# Patient Record
Sex: MALE | Race: BLACK OR AFRICAN AMERICAN | NOT HISPANIC OR LATINO | Employment: OTHER | URBAN - METROPOLITAN AREA
[De-identification: names, ages, dates, MRNs, and addresses within clinical notes are randomized per-mention and may not be internally consistent; named-entity substitution may affect disease eponyms.]

---

## 2017-11-28 LAB
BILIRUB SERPL-MCNC: NEGATIVE MG/DL
BLOOD URINE, POC: NEGATIVE
CLARITY, POC UA: NORMAL
COLOR, POC UA: NORMAL
GLUCOSE UR QL STRIP: NEGATIVE
KETONES UR QL STRIP: NEGATIVE
LEUKOCYTE EST, POC UA: NEGATIVE
NITRITE, POC UA: NEGATIVE
PH, POC UA: 6.5
PROTEIN, POC: NEGATIVE
SPECIFIC GRAVITY, POC UA: 1
UROBILINOGEN, POC UA: NORMAL

## 2019-11-08 LAB
INFLUENZA A ANTIGEN, POC: NEGATIVE
INFLUENZA B ANTIGEN, POC: NEGATIVE

## 2022-04-07 ENCOUNTER — HISTORICAL (OUTPATIENT)
Dept: ADMINISTRATIVE | Facility: HOSPITAL | Age: 37
End: 2022-04-07

## 2022-04-24 VITALS
BODY MASS INDEX: 31.64 KG/M2 | OXYGEN SATURATION: 96 % | WEIGHT: 208.75 LBS | SYSTOLIC BLOOD PRESSURE: 134 MMHG | DIASTOLIC BLOOD PRESSURE: 73 MMHG | HEIGHT: 68 IN

## 2022-09-15 ENCOUNTER — HISTORICAL (OUTPATIENT)
Dept: ADMINISTRATIVE | Facility: HOSPITAL | Age: 37
End: 2022-09-15

## 2022-09-22 ENCOUNTER — HISTORICAL (OUTPATIENT)
Dept: ADMINISTRATIVE | Facility: HOSPITAL | Age: 37
End: 2022-09-22

## 2023-03-30 ENCOUNTER — HOSPITAL ENCOUNTER (EMERGENCY)
Facility: HOSPITAL | Age: 38
Discharge: HOME | End: 2023-03-30
Attending: EMERGENCY MEDICINE
Payer: COMMERCIAL

## 2023-03-30 VITALS
RESPIRATION RATE: 18 BRPM | SYSTOLIC BLOOD PRESSURE: 141 MMHG | DIASTOLIC BLOOD PRESSURE: 83 MMHG | TEMPERATURE: 99 F | OXYGEN SATURATION: 94 % | HEART RATE: 77 BPM

## 2023-03-30 DIAGNOSIS — R51.9 NONINTRACTABLE HEADACHE, UNSPECIFIED CHRONICITY PATTERN, UNSPECIFIED HEADACHE TYPE: Primary | ICD-10-CM

## 2023-03-30 LAB
FLUAV AG UPPER RESP QL IA.RAPID: NOT DETECTED
FLUBV AG UPPER RESP QL IA.RAPID: NOT DETECTED
SARS-COV-2 RNA RESP QL NAA+PROBE: NOT DETECTED

## 2023-03-30 PROCEDURE — 99285 EMERGENCY DEPT VISIT HI MDM: CPT | Mod: 25

## 2023-03-30 PROCEDURE — 63600175 PHARM REV CODE 636 W HCPCS: Performed by: PHYSICIAN ASSISTANT

## 2023-03-30 PROCEDURE — 96374 THER/PROPH/DIAG INJ IV PUSH: CPT

## 2023-03-30 PROCEDURE — 96375 TX/PRO/DX INJ NEW DRUG ADDON: CPT

## 2023-03-30 PROCEDURE — 0240U COVID/FLU A&B PCR: CPT | Performed by: PHYSICIAN ASSISTANT

## 2023-03-30 PROCEDURE — 63600175 PHARM REV CODE 636 W HCPCS: Performed by: NURSE PRACTITIONER

## 2023-03-30 PROCEDURE — 25000003 PHARM REV CODE 250: Performed by: NURSE PRACTITIONER

## 2023-03-30 PROCEDURE — 96372 THER/PROPH/DIAG INJ SC/IM: CPT | Performed by: PHYSICIAN ASSISTANT

## 2023-03-30 PROCEDURE — 96361 HYDRATE IV INFUSION ADD-ON: CPT

## 2023-03-30 RX ORDER — KETOROLAC TROMETHAMINE 30 MG/ML
60 INJECTION, SOLUTION INTRAMUSCULAR; INTRAVENOUS
Status: COMPLETED | OUTPATIENT
Start: 2023-03-30 | End: 2023-03-30

## 2023-03-30 RX ORDER — PROCHLORPERAZINE EDISYLATE 5 MG/ML
10 INJECTION INTRAMUSCULAR; INTRAVENOUS
Status: COMPLETED | OUTPATIENT
Start: 2023-03-30 | End: 2023-03-30

## 2023-03-30 RX ORDER — DIPHENHYDRAMINE HYDROCHLORIDE 50 MG/ML
25 INJECTION INTRAMUSCULAR; INTRAVENOUS
Status: COMPLETED | OUTPATIENT
Start: 2023-03-30 | End: 2023-03-30

## 2023-03-30 RX ORDER — PROCHLORPERAZINE MALEATE 10 MG
10 TABLET ORAL EVERY 6 HOURS PRN
Qty: 15 TABLET | Refills: 0 | Status: SHIPPED | OUTPATIENT
Start: 2023-03-30 | End: 2023-11-07

## 2023-03-30 RX ADMIN — DIPHENHYDRAMINE HYDROCHLORIDE 25 MG: 50 INJECTION INTRAMUSCULAR; INTRAVENOUS at 08:03

## 2023-03-30 RX ADMIN — SODIUM CHLORIDE 1000 ML: 9 INJECTION, SOLUTION INTRAVENOUS at 08:03

## 2023-03-30 RX ADMIN — PROCHLORPERAZINE EDISYLATE 10 MG: 5 INJECTION INTRAMUSCULAR; INTRAVENOUS at 08:03

## 2023-03-30 RX ADMIN — KETOROLAC TROMETHAMINE 60 MG: 30 INJECTION, SOLUTION INTRAMUSCULAR at 08:03

## 2023-03-30 NOTE — FIRST PROVIDER EVALUATION
Medical screening examination initiated.  I have conducted a focused provider triage encounter, findings are as follows:    Brief history of present illness:  38 yo male presents to ED for evaluation of headache and nausea for the past 2 days. Complains of pressure behind eyes. Complains cough. Denies any nausea, vomiting, photophobia, dizziness. Took Excedrin 1 hour PTA.     Vitals:    03/30/23 1849   BP: (!) 141/83   Pulse: 77   Resp: 18   Temp: 99 °F (37.2 °C)   TempSrc: Oral   SpO2: (!) 94%       Pertinent physical exam:  Patient is awake and alert and oriented.  Ambulatory to triage.  In no acute distress.      Brief workup plan:  COVID/flu, toradol    Preliminary workup initiated; this workup will be continued and followed by the physician or advanced practice provider that is assigned to the patient when roomed.

## 2023-03-31 NOTE — ED PROVIDER NOTES
Encounter Date: 3/30/2023       History     Chief Complaint   Patient presents with    Headache     POV with migraine and nausea x2 days, denies photosensitivity. Usually takes excedrin, no relief     See MDM    The history is provided by the patient. No  was used.   Review of patient's allergies indicates:  No Known Allergies  Past Medical History:   Diagnosis Date    Known health problems: none      Past Surgical History:   Procedure Laterality Date    STOMACH SURGERY      ulcer     History reviewed. No pertinent family history.  Social History     Tobacco Use    Smoking status: Never    Smokeless tobacco: Never   Substance Use Topics    Alcohol use: Not Currently    Drug use: Not Currently     Review of Systems   Constitutional:  Negative for fever.   Respiratory:  Negative for cough and shortness of breath.    Cardiovascular:  Negative for chest pain.   Gastrointestinal:  Positive for nausea. Negative for abdominal pain.   Genitourinary:  Negative for difficulty urinating and dysuria.   Musculoskeletal:  Negative for gait problem.   Skin:  Negative for color change.   Neurological:  Positive for headaches. Negative for dizziness and speech difficulty.   Psychiatric/Behavioral:  Negative for hallucinations and suicidal ideas.    All other systems reviewed and are negative.    Physical Exam     Initial Vitals [03/30/23 1849]   BP Pulse Resp Temp SpO2   (!) 141/83 77 18 99 °F (37.2 °C) (!) 94 %      MAP       --         Physical Exam    Nursing note and vitals reviewed.  Constitutional: He appears well-developed and well-nourished.   HENT:   Head: Normocephalic.   Eyes: EOM are normal.   Neck: Neck supple.   Normal range of motion.  Cardiovascular:  Normal rate, regular rhythm, normal heart sounds and intact distal pulses.           Pulmonary/Chest: Breath sounds normal.   Abdominal: Abdomen is soft. Bowel sounds are normal.   Musculoskeletal:         General: Normal range of motion.       Cervical back: Normal range of motion and neck supple.     Neurological: He is alert and oriented to person, place, and time. He has normal strength.   Skin: Skin is warm and dry. Capillary refill takes less than 2 seconds.   Psychiatric: He has a normal mood and affect. His behavior is normal. Judgment and thought content normal.       ED Course   Procedures  Labs Reviewed   COVID/FLU A&B PCR - Normal    Narrative:     The Xpert Xpress SARS-CoV-2/FLU/RSV plus is a rapid, multiplexed real-time PCR test intended for the simultaneous qualitative detection and differentiation of SARS-CoV-2, Influenza A, Influenza B, and respiratory syncytial virus (RSV) viral RNA in either nasopharyngeal swab or nasal swab specimens.                Imaging Results              CT Head Without Contrast (Preliminary result)  Result time 03/30/23 20:34:28      Preliminary result by Cachorro Heard Jr., MD (03/30/23 20:34:28)                   Narrative:    START OF REPORT:  TECHNIQUE: CT OF THE HEAD WAS PERFORMED WITHOUT INTRAVENOUS CONTRAST WITH AXIAL AS WELL AS CORONAL AND SAGITTAL IMAGES.    COMPARISON: NONE.    DOSAGE INFORMATION: AUTOMATED EXPOSURE CONTROL WAS UTILIZED.    CLINICAL HISTORY: HEADACHE.    Findings:  Hemorrhage: No acute intracranial hemorrhage is seen.  CSF spaces: The ventricles sulci and basal cisterns are within normal limits.  Brain parenchyma: Unremarkable with preservation of the grey white junction throughout.  Cerebellum: Unremarkable.  Sella and skull base: The sella appears to be within normal limits for age.  Intracranial calcifications: Incidental note is made of bilateral choroid plexus calcification. Incidental note is made of some pineal region calcification.  Calvarium: No acute linear or depressed skull fracture is seen.    Maxillofacial Structures:  Paranasal sinuses: There are some lobulated soft tissue densities in both maxillary sinuses. These may reflect polyps versus mucous retention  cysts.  Orbits: The orbits appear unremarkable.  Zygomatic arches: The zygomatic arches are intact and unremarkable.  Temporal bones and mastoids: The temporal bones and mastoids appear unremarkable.  TMJ: The mandibular condyles appear normally placed with respect to the mandibular fossa.      Impression:  1. No acute intracranial process identified. Details and findings as noted above.                          Preliminary result by Interface, Rad Results In (03/30/23 20:34:28)                   Narrative:    START OF REPORT:  TECHNIQUE: CT OF THE HEAD WAS PERFORMED WITHOUT INTRAVENOUS CONTRAST WITH AXIAL AS WELL AS CORONAL AND SAGITTAL IMAGES.    COMPARISON: NONE.    DOSAGE INFORMATION: AUTOMATED EXPOSURE CONTROL WAS UTILIZED.    CLINICAL HISTORY: HEADACHE.    Findings:  Hemorrhage: No acute intracranial hemorrhage is seen.  CSF spaces: The ventricles sulci and basal cisterns are within normal limits.  Brain parenchyma: Unremarkable with preservation of the grey white junction throughout.  Cerebellum: Unremarkable.  Sella and skull base: The sella appears to be within normal limits for age.  Intracranial calcifications: Incidental note is made of bilateral choroid plexus calcification. Incidental note is made of some pineal region calcification.  Calvarium: No acute linear or depressed skull fracture is seen.    Maxillofacial Structures:  Paranasal sinuses: There are some lobulated soft tissue densities in both maxillary sinuses. These may reflect polyps versus mucous retention cysts.  Orbits: The orbits appear unremarkable.  Zygomatic arches: The zygomatic arches are intact and unremarkable.  Temporal bones and mastoids: The temporal bones and mastoids appear unremarkable.  TMJ: The mandibular condyles appear normally placed with respect to the mandibular fossa.      Impression:  1. No acute intracranial process identified. Details and findings as noted above.                                         Medications    sodium chloride 0.9% bolus 1,000 mL 1,000 mL (1,000 mLs Intravenous New Bag 3/30/23 2054)   ketorolac injection 60 mg (60 mg Intramuscular Given 3/30/23 2053)   prochlorperazine injection Soln 10 mg (10 mg Intravenous Given 3/30/23 2053)   diphenhydrAMINE injection 25 mg (25 mg Intravenous Given 3/30/23 2052)     Medical Decision Making:   Initial Assessment:   Historian:  Patient.  Patient is a 37-year-old male  that presents with headache that has been present 2 days. Associated symptoms nausea. Surrounding information is patient has had headaches before but it is worsening. Exacerbated by nothing. Relieved by nothing. Patient treatment prior to arrival Excedrin. Risk factors include none. Other history pertaining to this complaint nothing.   Assessment:  See physical exam.    Differential Diagnosis:   Intracranial hemorrhage, headache, migraine  ED Management:  History was obtained.  Physical was performed.  CT head was negative.  Patient has no neurological deficits.  We will send him home on Compazine for headaches.  No medical or surgical consult for indicated.  No social determinants that affect his healthcare were noted.  Additional MDM:     X-Rays: CT of head was negative                      Clinical Impression:   Final diagnoses:  [R51.9] Nonintractable headache, unspecified chronicity pattern, unspecified headache type (Primary)        ED Disposition Condition    Discharge Stable          ED Prescriptions       Medication Sig Dispense Start Date End Date Auth. Provider    prochlorperazine (COMPAZINE) 10 MG tablet Take 1 tablet (10 mg total) by mouth every 6 (six) hours as needed. 15 tablet 3/30/2023 -- NEEL Schmitt          Follow-up Information       Follow up With Specialties Details Why Contact Info    Your Primary Care Provider  Call in 3 days ed follow up              NEEL Schmitt  03/30/23 2106

## 2023-07-11 DIAGNOSIS — G43.709 CHRONIC MIGRAINE WITHOUT AURA WITHOUT STATUS MIGRAINOSUS, NOT INTRACTABLE: Primary | ICD-10-CM

## 2023-09-24 ENCOUNTER — HOSPITAL ENCOUNTER (EMERGENCY)
Facility: HOSPITAL | Age: 38
Discharge: HOME | End: 2023-09-24
Attending: STUDENT IN AN ORGANIZED HEALTH CARE EDUCATION/TRAINING PROGRAM
Payer: COMMERCIAL

## 2023-09-24 VITALS
DIASTOLIC BLOOD PRESSURE: 84 MMHG | WEIGHT: 220 LBS | RESPIRATION RATE: 20 BRPM | HEIGHT: 65 IN | TEMPERATURE: 99 F | OXYGEN SATURATION: 97 % | HEART RATE: 90 BPM | SYSTOLIC BLOOD PRESSURE: 136 MMHG | BODY MASS INDEX: 36.65 KG/M2

## 2023-09-24 DIAGNOSIS — K52.9 GASTROENTERITIS: ICD-10-CM

## 2023-09-24 DIAGNOSIS — R50.9 FEVER: Primary | ICD-10-CM

## 2023-09-24 DIAGNOSIS — K29.70 GASTRITIS, PRESENCE OF BLEEDING UNSPECIFIED, UNSPECIFIED CHRONICITY, UNSPECIFIED GASTRITIS TYPE: ICD-10-CM

## 2023-09-24 DIAGNOSIS — R51.9 NONINTRACTABLE HEADACHE, UNSPECIFIED CHRONICITY PATTERN, UNSPECIFIED HEADACHE TYPE: ICD-10-CM

## 2023-09-24 DIAGNOSIS — R11.2 NAUSEA AND VOMITING, UNSPECIFIED VOMITING TYPE: ICD-10-CM

## 2023-09-24 DIAGNOSIS — K76.89 HEPATIC CYST: ICD-10-CM

## 2023-09-24 LAB
ABS NEUT (OLG): 22.25 X10(3)/MCL (ref 2.1–9.2)
ALBUMIN SERPL-MCNC: 4.1 G/DL (ref 3.5–5)
ALBUMIN/GLOB SERPL: 1.2 RATIO (ref 1.1–2)
ALP SERPL-CCNC: 62 UNIT/L (ref 40–150)
ALT SERPL-CCNC: 24 UNIT/L (ref 0–55)
APPEARANCE UR: CLEAR
AST SERPL-CCNC: 19 UNIT/L (ref 5–34)
BACTERIA #/AREA URNS AUTO: NORMAL /HPF
BILIRUB SERPL-MCNC: 0.4 MG/DL
BILIRUB UR QL STRIP.AUTO: NEGATIVE
BUN SERPL-MCNC: 11.6 MG/DL (ref 8.9–20.6)
CALCIUM SERPL-MCNC: 9.4 MG/DL (ref 8.4–10.2)
CHLORIDE SERPL-SCNC: 107 MMOL/L (ref 98–107)
CO2 SERPL-SCNC: 23 MMOL/L (ref 22–29)
COLOR UR AUTO: YELLOW
CREAT SERPL-MCNC: 1.11 MG/DL (ref 0.73–1.18)
ERYTHROCYTE [DISTWIDTH] IN BLOOD BY AUTOMATED COUNT: 13.2 % (ref 11.5–17)
FLUAV AG UPPER RESP QL IA.RAPID: NOT DETECTED
FLUBV AG UPPER RESP QL IA.RAPID: NOT DETECTED
GFR SERPLBLD CREATININE-BSD FMLA CKD-EPI: >60 MLS/MIN/1.73/M2
GLOBULIN SER-MCNC: 3.4 GM/DL (ref 2.4–3.5)
GLUCOSE SERPL-MCNC: 130 MG/DL (ref 74–100)
GLUCOSE UR QL STRIP.AUTO: NEGATIVE
HCT VFR BLD AUTO: 42.2 % (ref 42–52)
HGB BLD-MCNC: 14.1 G/DL (ref 14–18)
INSTRUMENT WBC (OLG): 23.92 X10(3)/MCL
KETONES UR QL STRIP.AUTO: NEGATIVE
LACTATE SERPL-SCNC: 1 MMOL/L (ref 0.5–2.2)
LEUKOCYTE ESTERASE UR QL STRIP.AUTO: NEGATIVE
LYMPHOCYTES NFR BLD MANUAL: 0.72 X10(3)/MCL
LYMPHOCYTES NFR BLD MANUAL: 3 %
MCH RBC QN AUTO: 29.1 PG (ref 27–31)
MCHC RBC AUTO-ENTMCNC: 33.4 G/DL (ref 33–36)
MCV RBC AUTO: 87 FL (ref 80–94)
MONOCYTES NFR BLD MANUAL: 0.96 X10(3)/MCL (ref 0.1–1.3)
MONOCYTES NFR BLD MANUAL: 4 %
NEUTROPHILS NFR BLD MANUAL: 93 %
NITRITE UR QL STRIP.AUTO: NEGATIVE
NRBC BLD AUTO-RTO: 0 %
PH UR STRIP.AUTO: 5.5 [PH]
PLATELET # BLD AUTO: 236 X10(3)/MCL (ref 130–400)
PLATELET # BLD EST: NORMAL 10*3/UL
PMV BLD AUTO: 10.2 FL (ref 7.4–10.4)
POTASSIUM SERPL-SCNC: 3.8 MMOL/L (ref 3.5–5.1)
PROT SERPL-MCNC: 7.5 GM/DL (ref 6.4–8.3)
PROT UR QL STRIP.AUTO: NEGATIVE
RBC # BLD AUTO: 4.85 X10(6)/MCL (ref 4.7–6.1)
RBC #/AREA URNS AUTO: <5 /HPF
RBC MORPH BLD: NORMAL
RBC UR QL AUTO: ABNORMAL
SARS-COV-2 RNA RESP QL NAA+PROBE: NOT DETECTED
SODIUM SERPL-SCNC: 139 MMOL/L (ref 136–145)
SP GR UR STRIP.AUTO: 1.02 (ref 1–1.03)
SQUAMOUS #/AREA URNS AUTO: <5 /HPF
UROBILINOGEN UR STRIP-ACNC: 0.2
WBC # SPEC AUTO: 23.92 X10(3)/MCL (ref 4.5–11.5)
WBC #/AREA URNS AUTO: <5 /HPF

## 2023-09-24 PROCEDURE — 96361 HYDRATE IV INFUSION ADD-ON: CPT

## 2023-09-24 PROCEDURE — 85027 COMPLETE CBC AUTOMATED: CPT | Performed by: PHYSICIAN ASSISTANT

## 2023-09-24 PROCEDURE — 63600175 PHARM REV CODE 636 W HCPCS: Performed by: PHYSICIAN ASSISTANT

## 2023-09-24 PROCEDURE — 99285 EMERGENCY DEPT VISIT HI MDM: CPT | Mod: 25

## 2023-09-24 PROCEDURE — 83605 ASSAY OF LACTIC ACID: CPT | Performed by: STUDENT IN AN ORGANIZED HEALTH CARE EDUCATION/TRAINING PROGRAM

## 2023-09-24 PROCEDURE — 81001 URINALYSIS AUTO W/SCOPE: CPT | Performed by: PHYSICIAN ASSISTANT

## 2023-09-24 PROCEDURE — 87040 BLOOD CULTURE FOR BACTERIA: CPT | Performed by: STUDENT IN AN ORGANIZED HEALTH CARE EDUCATION/TRAINING PROGRAM

## 2023-09-24 PROCEDURE — 63600175 PHARM REV CODE 636 W HCPCS: Performed by: STUDENT IN AN ORGANIZED HEALTH CARE EDUCATION/TRAINING PROGRAM

## 2023-09-24 PROCEDURE — 0240U COVID/FLU A&B PCR: CPT | Performed by: PHYSICIAN ASSISTANT

## 2023-09-24 PROCEDURE — 96375 TX/PRO/DX INJ NEW DRUG ADDON: CPT

## 2023-09-24 PROCEDURE — 25000003 PHARM REV CODE 250: Performed by: PHYSICIAN ASSISTANT

## 2023-09-24 PROCEDURE — 80053 COMPREHEN METABOLIC PANEL: CPT | Performed by: PHYSICIAN ASSISTANT

## 2023-09-24 PROCEDURE — 25500020 PHARM REV CODE 255: Performed by: STUDENT IN AN ORGANIZED HEALTH CARE EDUCATION/TRAINING PROGRAM

## 2023-09-24 PROCEDURE — 96374 THER/PROPH/DIAG INJ IV PUSH: CPT

## 2023-09-24 RX ORDER — METOCLOPRAMIDE HYDROCHLORIDE 5 MG/ML
10 INJECTION INTRAMUSCULAR; INTRAVENOUS
Status: COMPLETED | OUTPATIENT
Start: 2023-09-24 | End: 2023-09-24

## 2023-09-24 RX ORDER — ONDANSETRON 4 MG/1
4 TABLET, ORALLY DISINTEGRATING ORAL EVERY 8 HOURS PRN
Qty: 15 TABLET | Refills: 0 | Status: SHIPPED | OUTPATIENT
Start: 2023-09-24 | End: 2023-09-29

## 2023-09-24 RX ORDER — KETOROLAC TROMETHAMINE 30 MG/ML
15 INJECTION, SOLUTION INTRAMUSCULAR; INTRAVENOUS
Status: COMPLETED | OUTPATIENT
Start: 2023-09-24 | End: 2023-09-24

## 2023-09-24 RX ORDER — BUTALBITAL, ACETAMINOPHEN AND CAFFEINE 50; 325; 40 MG/1; MG/1; MG/1
1 TABLET ORAL EVERY 12 HOURS PRN
Qty: 20 TABLET | Refills: 0 | Status: SHIPPED | OUTPATIENT
Start: 2023-09-24 | End: 2023-10-04

## 2023-09-24 RX ORDER — ACETAMINOPHEN 500 MG
1000 TABLET ORAL
Status: COMPLETED | OUTPATIENT
Start: 2023-09-24 | End: 2023-09-24

## 2023-09-24 RX ORDER — DIPHENHYDRAMINE HYDROCHLORIDE 50 MG/ML
25 INJECTION INTRAMUSCULAR; INTRAVENOUS
Status: COMPLETED | OUTPATIENT
Start: 2023-09-24 | End: 2023-09-24

## 2023-09-24 RX ORDER — LEVOFLOXACIN 750 MG/1
750 TABLET ORAL DAILY
Qty: 5 TABLET | Refills: 0 | Status: SHIPPED | OUTPATIENT
Start: 2023-09-24 | End: 2023-09-29

## 2023-09-24 RX ORDER — ONDANSETRON 2 MG/ML
4 INJECTION INTRAMUSCULAR; INTRAVENOUS
Status: COMPLETED | OUTPATIENT
Start: 2023-09-24 | End: 2023-09-24

## 2023-09-24 RX ADMIN — ONDANSETRON 4 MG: 2 INJECTION INTRAMUSCULAR; INTRAVENOUS at 07:09

## 2023-09-24 RX ADMIN — SODIUM CHLORIDE, POTASSIUM CHLORIDE, SODIUM LACTATE AND CALCIUM CHLORIDE 1000 ML: 600; 310; 30; 20 INJECTION, SOLUTION INTRAVENOUS at 07:09

## 2023-09-24 RX ADMIN — DIPHENHYDRAMINE HYDROCHLORIDE 25 MG: 50 INJECTION INTRAMUSCULAR; INTRAVENOUS at 09:09

## 2023-09-24 RX ADMIN — METOCLOPRAMIDE 10 MG: 5 INJECTION, SOLUTION INTRAMUSCULAR; INTRAVENOUS at 09:09

## 2023-09-24 RX ADMIN — IOPAMIDOL 100 ML: 755 INJECTION, SOLUTION INTRAVENOUS at 08:09

## 2023-09-24 RX ADMIN — ACETAMINOPHEN 1000 MG: 500 TABLET ORAL at 06:09

## 2023-09-24 RX ADMIN — KETOROLAC TROMETHAMINE 15 MG: 30 INJECTION, SOLUTION INTRAMUSCULAR at 09:09

## 2023-09-24 RX ADMIN — SODIUM CHLORIDE, POTASSIUM CHLORIDE, SODIUM LACTATE AND CALCIUM CHLORIDE 1000 ML: 600; 310; 30; 20 INJECTION, SOLUTION INTRAVENOUS at 08:09

## 2023-09-24 NOTE — FIRST PROVIDER EVALUATION
"Medical screening examination initiated.  I have conducted a focused provider triage encounter, findings are as follows:    Brief history of present illness:  37-year-old male presents to ED for evaluation of generalized fatigue, nausea and diarrhea since yesterday.  Patient reports subjective fever. Complains of headache. Reports having hair transplant 2 weeks ago.     Vitals:    09/24/23 1754   BP: 123/69   Pulse: (!) 120   Resp: 20   Temp: (!) 101.1 °F (38.4 °C)   TempSrc: Oral   SpO2: 98%   Weight: 99.8 kg (220 lb)   Height: 5' 5" (1.651 m)       Pertinent physical exam:  Patient is awake and alert and oriented.  Ambulatory to triage.  In no acute distress.      Brief workup plan:  labs, UA, COVID/FLU tylenol    Preliminary workup initiated; this workup will be continued and followed by the physician or advanced practice provider that is assigned to the patient when roomed.  "

## 2023-09-25 NOTE — DISCHARGE INSTRUCTIONS
You may take Fioricet as needed for migraines.      You may take ibuprofen as needed for fever.      Began taking Levaquin as prescribed.      You may take Zofran as needed for nausea and vomiting.      Return to the emergency department few any new or worsening pain, difficulty breathing, nausea, vomiting, shortness of breath, persistent fever, worsening headache, neck pain or neck stiffness or any other symptoms.    Your CT scan also showed a hepatic cyst which was present in the scan before in 2022.  Follow-up with your primary care doctor.

## 2023-09-25 NOTE — ED PROVIDER NOTES
Encounter Date: 9/24/2023    SCRIBE #1 NOTE: I, Eliceo Kowalski, am scribing for, and in the presence of,  Fortunato Aviles MD. I have scribed the following portions of the note - Other sections scribed: HPI,ROS,PE.       History     Chief Complaint   Patient presents with    Nausea     Fatigue, nausea, diarrhea, headache, chills, denies vomiting- took phenegran yesterday. Hair transplant 9/13     38 y/o male with PMHx of GERD, history of chronic migraines set to follow up with Neurology in 2 weeks presents to ED c/o nausea onset ~1600 yesterday. He reports associated symptoms of fever onset 0200 today, diarrhea onset last night after eating or drinking, weakness, and headache. He denies any cough, rhinorrhea, abdominal pain, SOB, rash, or dysuria. He reports having a hair transplant 2x weeks ago and takes percocet when needed for the pain from the transplant. He denies any erythema or drainage from the transplant.  Has taken promethazine without relief for nausea.    The history is provided by the patient. No  was used.   Nausea  The current episode started yesterday. Associated symptoms include headaches. Pertinent negatives include no chest pain, no abdominal pain and no shortness of breath.     Review of patient's allergies indicates:  No Known Allergies  Past Medical History:   Diagnosis Date    Known health problems: none      Past Surgical History:   Procedure Laterality Date    STOMACH SURGERY      ulcer     No family history on file.  Social History     Tobacco Use    Smoking status: Never    Smokeless tobacco: Never   Substance Use Topics    Alcohol use: Not Currently    Drug use: Not Currently     Review of Systems   Constitutional:  Positive for fever.   HENT:  Negative for rhinorrhea and sore throat.    Eyes:  Negative for visual disturbance.   Respiratory:  Negative for cough and shortness of breath.    Cardiovascular:  Negative for chest pain.   Gastrointestinal:  Positive for  diarrhea and nausea. Negative for abdominal pain.   Genitourinary:  Negative for dysuria.   Musculoskeletal:  Negative for joint swelling.   Skin:  Negative for rash.   Neurological:  Positive for weakness and headaches.   Psychiatric/Behavioral:  Negative for confusion.    All other systems reviewed and are negative.      Physical Exam     Initial Vitals [09/24/23 1754]   BP Pulse Resp Temp SpO2   123/69 (!) 120 20 (!) 101.1 °F (38.4 °C) 98 %      MAP       --         Physical Exam    Nursing note and vitals reviewed.  Constitutional: He appears well-developed and well-nourished. He is not diaphoretic. No distress.   HENT:   Head: Normocephalic and atraumatic.   Status post hair transplant.  No marked erythema or abscess noted.   Eyes: Conjunctivae and EOM are normal. Pupils are equal, round, and reactive to light.   Neck: No tracheal deviation present.   No meningismus.  No nuchal rigidity.   Normal range of motion.  Cardiovascular:  Regular rhythm, normal heart sounds and intact distal pulses.           No murmur heard.  Tachycardic.   Pulmonary/Chest: Breath sounds normal. No stridor. No respiratory distress. He has no wheezes. He has no rales.   Abdominal: Abdomen is soft. He exhibits no distension. There is no abdominal tenderness.   Musculoskeletal:         General: No tenderness or edema. Normal range of motion.      Cervical back: Normal range of motion.     Neurological: He is alert and oriented to person, place, and time. No cranial nerve deficit.   Skin: Skin is warm and dry. Capillary refill takes less than 2 seconds. No rash noted. No erythema.   Psychiatric: He has a normal mood and affect.         ED Course   Procedures  Labs Reviewed   COMPREHENSIVE METABOLIC PANEL - Abnormal; Notable for the following components:       Result Value    Glucose Level 130 (*)     All other components within normal limits   URINALYSIS, REFLEX TO URINE CULTURE - Abnormal; Notable for the following components:    Blood,  UA Trace (*)     All other components within normal limits   CBC WITH DIFFERENTIAL - Abnormal; Notable for the following components:    WBC 23.92 (*)     All other components within normal limits   MANUAL DIFFERENTIAL - Abnormal; Notable for the following components:    Neutrophils Abs 22.2456 (*)     All other components within normal limits   BLOOD CULTURE [facility] - Normal   BLOOD CULTURE [facility] - Normal   COVID/FLU A&B PCR - Normal    Narrative:     The Xpert Xpress SARS-CoV-2/FLU/RSV plus is a rapid, multiplexed real-time PCR test intended for the simultaneous qualitative detection and differentiation of SARS-CoV-2, Influenza A, Influenza B, and respiratory syncytial virus (RSV) viral RNA in either nasopharyngeal swab or nasal swab specimens.         LACTIC ACID, PLASMA - Normal   URINALYSIS, MICROSCOPIC - Normal   CBC W/ AUTO DIFFERENTIAL    Narrative:     The following orders were created for panel order CBC auto differential.  Procedure                               Abnormality         Status                     ---------                               -----------         ------                     CBC with Differential[[id number]]        Abnormal            Final result               Manual Differential[[id number]]          Abnormal            Final result                 Please view results for these tests on the individual orders.          Imaging Results              X-Ray Chest AP Portable (Final result)  Result time 09/25/23 07:27:53      Final result by Roger Escalera MD (09/25/23 07:27:53)                   Impression:      No acute cardiopulmonary process.      Electronically signed by: Roger Escalera  Date:    09/25/2023  Time:    07:27               Narrative:    EXAMINATION:  XR CHEST AP PORTABLE    CLINICAL HISTORY:  Fever, unspecified    TECHNIQUE:  Single view of the chest    COMPARISON:  No prior imaging available for comparison.    FINDINGS:  No focal opacification, pleural effusion, or  pneumothorax.    The cardiomediastinal silhouette is within normal limits.    No acute osseous abnormality.                        Wet Read by Fortunato Aviles MD (09/24/23 21:52:41, [facility] - Emergency Dept, Emergency Medicine)    No acute findings.                                      CT Abdomen Pelvis With Contrast (Final result)  Result time 09/25/23 09:06:11      Final result by Leo Mas MD (09/25/23 09:06:11)                   Impression:      Suspect mild distal colitis.  Single mildly enlarged right mid abdominal mesenteric lymph node may be reactive.    Mild discrepancy with the preliminary radiology report.  Final impression discussed with Dr. Frederick at the time of dictation.      Electronically signed by: Leo Mas  Date:    09/25/2023  Time:    09:06               Narrative:    EXAMINATION:  CT ABDOMEN PELVIS WITH CONTRAST    CLINICAL HISTORY:  Abdominal abscess/infection suspected;Nausea/vomiting;    TECHNIQUE:  CT imaging of the abdomen and pelvis after intravenous contrast. Dose length product 941 mGycm. Automatic exposure control, adjustment of mA/kV or iterative reconstruction technique used to limit radiation dose.    COMPARISON:  No relevant comparison studies available at the time of dictation.    FINDINGS:  Liver/biliary: 7 mm right hepatic hypodensity too small to fully assess, but statistically benign, likely tiny cyst.  No radiodense gallstones. No intra or extrahepatic biliary ductal dilation.    Pancreas: Normal.    Spleen: Normal.    Adrenals: Normal.    Genitourinary: Symmetric renal enhancement. No hydronephrosis. Bladder within normal limits. No pelvic mass.    Stomach/bowel: No evidence of bowel obstruction. Appendix not seen with suspected prior appendectomy.  Distal colon under distended, but suspect mild distal colonic wall thickening.    Lymph nodes/peritoneum: Mildly enlarged right mid abdominal mesenteric lymph node measuring 11 mm short axis.  No  other lymphadenopathy appreciated.  No ascites or free air. No fluid collection.    Vasculature: Normal abdominal aortic caliber.    Abdominal wall: Small fat containing umbilical hernia.    Lung bases: No consolidation or pleural effusion.    Musculoskeletal: No acute osseous findings.                        Preliminary result by Leo Mas MD (09/24/23 20:36:38)                   Narrative:    START OF REPORT:  Technique: CT of the abdomen and pelvis was performed with axial images as well as sagittal and coronal reconstruction images with intravenous contrast.    Comparison: None available.    Clinical History: Abdominal pain n/v/d fever.    Dosage Information: Automated Exposure Control was utilized.    Findings:  Thorax:  Lungs: The visualized lung bases appear unremarkable.  Pleura: No effusions or thickening are seen.  Abdomen:  Abdominal Wall: There is a medium sized uncomplicated umbilical hernia which contains mesenteric fat.  Liver: There is a 0.7 cm hepatic cyst in the posteroinferior aspect of the right hepatic lobe (Series 2, Image 62). The liver otherwise appears unremarkable.  Biliary System: No intrahepatic or extrahepatic biliary duct dilatation is seen.  Gallbladder: The gallbladder appears unremarkable.  Pancreas: The pancreas appears unremarkable.  Spleen: The spleen appears unremarkable.  Adrenals: The adrenal glands appear unremarkable.  Kidneys: The kidneys appear unremarkable with no stones cysts masses or hydronephrosis.  Aorta: The abdominal aorta appears unremarkable.  IVC: Unremarkable.  Bowel:  Esophagus: The visualized distal esophagus appears unremarkable.  Stomach: There is mild wall thickening of the gastric antrum (Series 2, Image 58).  Duodenum: Unremarkable appearing duodenum.  Small Bowel: The small bowel appears unremarkable.  Colon: Nondistended.  Appendix: The appendix is not identified but no inflammatory changes are seen in the right lower quadrant to suggest  appendicitis.  Peritoneum: No intraperitoneal free air or ascites is seen.    Pelvis:  Bladder: The bladder appears unremarkable.  Male:  Prostate gland: The prostate gland appears unremarkable.  Inguinal Findings: Incidental note is made of a few prominent inguinal lymph nodes bilaterally.    Bony structures:  Dorsal Spine: The visualized dorsal spine appears unremarkable.  Bony Pelvis: The visualized bony structures of the pelvis appear unremarkable.      Impression:  1. There is mild wall thickening of the gastric antrum (Series 2, Image 58). Correlate with clinical and laboratory findings as this may reflect an element of gastritis.  2. Details and other findings as discussed above.                                         Medications   lactated ringers bolus 1,000 mL (0 mLs Intravenous Stopped 9/24/23 2003)   ondansetron injection 4 mg (4 mg Intravenous Given 9/24/23 1903)   acetaminophen tablet 1,000 mg (1,000 mg Oral Given 9/24/23 1800)   lactated ringers bolus 1,000 mL (0 mLs Intravenous Stopped 9/24/23 2127)   iopamidoL (ISOVUE-370) injection 100 mL (100 mLs Intravenous Given 9/24/23 2039)   ketorolac injection 15 mg (15 mg Intravenous Given 9/24/23 2155)   metoclopramide HCl injection 10 mg (10 mg Intravenous Given 9/24/23 2155)   diphenhydrAMINE injection 25 mg (25 mg Intravenous Given 9/24/23 2155)             Scribe Attestation:   Scribe #1: I performed the above scribed service and the documentation accurately describes the services I performed. I attest to the accuracy of the note.    Attending Attestation:           Physician Attestation for Scribe:  Physician Attestation Statement for Scribe #1: I, Fortunato Aviles MD, reviewed documentation, as scribed by Eliceo Kowalski in my presence, and it is both accurate and complete.         Medical Decision Making  Problems Addressed:  Fever: acute illness or injury that poses a threat to life or bodily functions  Gastritis, presence of bleeding unspecified,  unspecified chronicity, unspecified gastritis type: acute illness or injury that poses a threat to life or bodily functions  Gastroenteritis: acute illness or injury that poses a threat to life or bodily functions  Hepatic cyst: chronic illness or injury  Nausea and vomiting, unspecified vomiting type: acute illness or injury that poses a threat to life or bodily functions  Nonintractable headache, unspecified chronicity pattern, unspecified headache type: acute illness or injury that poses a threat to life or bodily functions    Amount and/or Complexity of Data Reviewed  Labs: ordered.  Radiology: ordered and independent interpretation performed.    Risk  Prescription drug management.  Parenteral controlled substances.           ED Course as of 09/26/23 0651   Sun Sep 24, 2023   2126 One day of fever.  [RP]   2149   Chronic migraine without aura without status migrainosus, not intractable   [RP]   Mon Sep 25, 2023   0906 Called by radiology with possible colitis on CT scan.  I reviewed the chart, patient with leukocytosis, prescribed levofloxacin by treating physician. [KS]      ED Course User Index  [KS] Monica Frederick MD  [RP] Fortunato Aviles MD               Medical Decision Making:   History:   I obtained history from: someone other than patient.       <> Summary of History: Collateral from family at bedside.  Old Medical Records: I decided to obtain old medical records.  Old Records Summarized: records from clinic visits, records from previous admission(s) and records from another hospital.       <> Summary of Records: Reviewed old records  Initial Assessment:   Fever  Differential Diagnosis:   Judging by the patient's chief complaint and pertinent history, the patient has the following possible differential diagnoses, including but not limited to the following.  Some of these are deemed to be lower likelihood and some more likely based on my physical exam and history combined with possible lab work and/or  imaging studies.   Please see the pertinent studies, and refer to the HPI.  Some of these diagnoses will take further evaluation to fully rule out, perhaps as an outpatient and the patient was encouraged to follow up when discharged for more comprehensive evaluation.    Viral syndrome, COVID, flu, UTI, intraabdominal infection, pneumonia, sepsis, pyelonephritis, cellulitis, abscess, meningitis  Clinical Tests:   Lab Tests: Reviewed and Ordered  Radiological Study: Ordered and Reviewed  ED Management:    Patient is a 37-year-old male, otherwise healthy presents to the emergency department for fever, nausea, vomiting, diarrhea.  See HPI.  See physical exam.  On arrival patient febrile and tachycardic.  Blood cultures have been obtained.  Lab work ordered.  Patient with leukocytosis noted.  Etiology of fever unclear though given nausea vomiting diarrhea suspect likely intra-abdominal source.  CT of the abdomen pelvis obtained and is as noted.  Patient given antipyretics with defervescence of his fever.  He was given IV fluids with marked improvement in his symptoms.  Tolerating p.o. on reassessment.  COVID flu negative.  Chest x-ray without any definitive pneumonia.  He has not had recent hair transplant and there some minimal erythema but no definitive cellulitis or abscess to the scalp.  He has no nuchal rigidity.  Has a history of chronic headaches and was given migraine cocktail with resolution of his headache.  On reassessment patient resting comfortably.  Discussed all results.  Possible gastroenteritis but given the degree of the patient's leukocytosis stool studies ordered.  Patient without any additional diarrhea during ED course.  Some concern for possible cellulitis, perhaps intra-abdominal source as a result will place patient on few days of Levaquin.  All results discussed with the patient.  Discussed that should not have fever lasting greater than 4-5 days.  Discussed all results with the patient.   Discussed possible etiologies of his fever.  Discussed need for follow-up with primary care physician.  Discussed return precautions.  Discussed return if any difficulty breathing, worsening symptoms, severe headache, neck pain or neck stiffness, chest pain, or any other symptoms.  Answered all questions time.  Hemodynamically stable for continued outpatient management strict return precautions.  Blood cultures have been obtained will call patient back if positive.  Hemodynamically stable at this time.  Patient verbalized understanding and agreed to plan as discussed.      Clinical Impression:   Final diagnoses:  [R50.9] Fever (Primary)  [K76.89] Hepatic cyst  [K52.9] Gastroenteritis  [K29.70] Gastritis, presence of bleeding unspecified, unspecified chronicity, unspecified gastritis type  [R51.9] Nonintractable headache, unspecified chronicity pattern, unspecified headache type  [R11.2] Nausea and vomiting, unspecified vomiting type        ED Disposition Condition    Discharge Stable          ED Prescriptions       Medication Sig Dispense Start Date End Date Auth. Provider    levoFLOXacin (LEVAQUIN) 750 MG tablet Take 1 tablet (750 mg total) by mouth once daily. for 5 days 5 tablet 9/24/2023 9/29/2023 Fortunaot Aviles MD    butalbital-acetaminophen-caffeine -40 mg (FIORICET, ESGIC) -40 mg per tablet Take 1 tablet by mouth every 12 (twelve) hours as needed for Headaches. 20 tablet 9/24/2023 10/4/2023 Fortunato Aviles MD    ondansetron (ZOFRAN-ODT) 4 MG TbDL Take 1 tablet (4 mg total) by mouth every 8 (eight) hours as needed. 15 tablet 9/24/2023 9/29/2023 Fortunato Aviles MD          Follow-up Information    None          Fortunato Aviles MD  09/25/23 0515       Fortunato Aviles MD  09/26/23 0635

## 2023-09-30 LAB
BACTERIA BLD CULT: NORMAL
BACTERIA BLD CULT: NORMAL

## 2023-11-07 ENCOUNTER — OFFICE VISIT (OUTPATIENT)
Dept: NEUROLOGY | Facility: CLINIC | Age: 38
End: 2023-11-07
Payer: COMMERCIAL

## 2023-11-07 VITALS
SYSTOLIC BLOOD PRESSURE: 108 MMHG | HEIGHT: 68 IN | HEART RATE: 90 BPM | DIASTOLIC BLOOD PRESSURE: 78 MMHG | BODY MASS INDEX: 32.74 KG/M2 | WEIGHT: 216 LBS

## 2023-11-07 DIAGNOSIS — G43.709 CHRONIC MIGRAINE WITHOUT AURA WITHOUT STATUS MIGRAINOSUS, NOT INTRACTABLE: ICD-10-CM

## 2023-11-07 PROCEDURE — 3008F BODY MASS INDEX DOCD: CPT | Mod: CPTII,S$GLB,, | Performed by: PSYCHIATRY & NEUROLOGY

## 2023-11-07 PROCEDURE — 99204 PR OFFICE/OUTPT VISIT, NEW, LEVL IV, 45-59 MIN: ICD-10-PCS | Mod: S$GLB,,, | Performed by: PSYCHIATRY & NEUROLOGY

## 2023-11-07 PROCEDURE — 99204 OFFICE O/P NEW MOD 45 MIN: CPT | Mod: S$GLB,,, | Performed by: PSYCHIATRY & NEUROLOGY

## 2023-11-07 PROCEDURE — 1159F MED LIST DOCD IN RCRD: CPT | Mod: CPTII,S$GLB,, | Performed by: PSYCHIATRY & NEUROLOGY

## 2023-11-07 PROCEDURE — 3008F PR BODY MASS INDEX (BMI) DOCUMENTED: ICD-10-PCS | Mod: CPTII,S$GLB,, | Performed by: PSYCHIATRY & NEUROLOGY

## 2023-11-07 PROCEDURE — 99999 PR PBB SHADOW E&M-EST. PATIENT-LVL III: CPT | Mod: PBBFAC,,, | Performed by: PSYCHIATRY & NEUROLOGY

## 2023-11-07 PROCEDURE — 3074F SYST BP LT 130 MM HG: CPT | Mod: CPTII,S$GLB,, | Performed by: PSYCHIATRY & NEUROLOGY

## 2023-11-07 PROCEDURE — 1160F PR REVIEW ALL MEDS BY PRESCRIBER/CLIN PHARMACIST DOCUMENTED: ICD-10-PCS | Mod: CPTII,S$GLB,, | Performed by: PSYCHIATRY & NEUROLOGY

## 2023-11-07 PROCEDURE — 3078F PR MOST RECENT DIASTOLIC BLOOD PRESSURE < 80 MM HG: ICD-10-PCS | Mod: CPTII,S$GLB,, | Performed by: PSYCHIATRY & NEUROLOGY

## 2023-11-07 PROCEDURE — 1159F PR MEDICATION LIST DOCUMENTED IN MEDICAL RECORD: ICD-10-PCS | Mod: CPTII,S$GLB,, | Performed by: PSYCHIATRY & NEUROLOGY

## 2023-11-07 PROCEDURE — 99999 PR PBB SHADOW E&M-EST. PATIENT-LVL III: ICD-10-PCS | Mod: PBBFAC,,, | Performed by: PSYCHIATRY & NEUROLOGY

## 2023-11-07 PROCEDURE — 3078F DIAST BP <80 MM HG: CPT | Mod: CPTII,S$GLB,, | Performed by: PSYCHIATRY & NEUROLOGY

## 2023-11-07 PROCEDURE — 3074F PR MOST RECENT SYSTOLIC BLOOD PRESSURE < 130 MM HG: ICD-10-PCS | Mod: CPTII,S$GLB,, | Performed by: PSYCHIATRY & NEUROLOGY

## 2023-11-07 PROCEDURE — 1160F RVW MEDS BY RX/DR IN RCRD: CPT | Mod: CPTII,S$GLB,, | Performed by: PSYCHIATRY & NEUROLOGY

## 2023-11-07 RX ORDER — SUMATRIPTAN 50 MG/1
TABLET, FILM COATED ORAL
Qty: 9 TABLET | Refills: 5 | Status: SHIPPED | OUTPATIENT
Start: 2023-11-07 | End: 2024-03-15

## 2023-11-07 RX ORDER — AZELASTINE HYDROCHLORIDE, FLUTICASONE PROPIONATE 137; 50 UG/1; UG/1
1 SPRAY, METERED NASAL
COMMUNITY
Start: 2023-02-16

## 2023-11-07 RX ORDER — ALBUTEROL SULFATE 90 UG/1
2 AEROSOL, METERED RESPIRATORY (INHALATION) EVERY 6 HOURS PRN
COMMUNITY
Start: 2023-02-16

## 2023-11-07 RX ORDER — BUTALBITAL, ACETAMINOPHEN, CAFFEINE AND CODEINE PHOSPHATE 50; 325; 40; 30 MG/1; MG/1; MG/1; MG/1
1 CAPSULE ORAL 2 TIMES DAILY PRN
COMMUNITY

## 2023-11-07 RX ORDER — PROPRANOLOL HYDROCHLORIDE 20 MG/1
20 TABLET ORAL 2 TIMES DAILY
Qty: 60 TABLET | Refills: 5 | Status: SHIPPED | OUTPATIENT
Start: 2023-11-07 | End: 2024-11-06

## 2023-11-07 RX ORDER — PROMETHAZINE HYDROCHLORIDE AND DEXTROMETHORPHAN HYDROBROMIDE 6.25; 15 MG/5ML; MG/5ML
5 SYRUP ORAL 4 TIMES DAILY PRN
COMMUNITY
Start: 2023-06-06

## 2023-11-07 NOTE — PROGRESS NOTES
Chief Complaint   Patient presents with    Migraine     NP: referred by Dr. Daniel for neuro consult to evaluate for Chronic migraine: Since a kid has been having headaches. Gets a headache at least twice a day, which he takes excedrin ES that relieves headache within an hour. Headaches are located to frontal area of head and behind eyes. Describes pain as throbbing/ sharp.         This is a 38 y.o. male  here for chronic migraine.  Patient has been having headaches since he was in grade school.  He recalls having to put his head down in class while the teacher was teaching because he would have a headache if he lifted his head up.  He had photophobia even around that time as well as nausea associated with those headaches.  Recalls asking a  to pray over his head and recalls this being the only thing that helped with the frequency of his headaches.  His headaches are now daily.  He has developed a habit of taking Excedrin extra strength every day twice a day for his headaches.  He often gets a headache mid morning and then 1 in the afternoon.  Headaches are throbbing, holocephalic, worse with movement.  Does not recall ever being on a preventive medication or a triptan.  He has tried Fioricet which did not work as well as the Excedrin.    CT head 3/23- normal        Medication List with Changes/Refills   New Medications    PROPRANOLOL (INDERAL) 20 MG TABLET    Take 1 tablet (20 mg total) by mouth 2 (two) times daily.    SUMATRIPTAN (IMITREX) 50 MG TABLET    Take 1 tablet prn once for migraine, may repeat dose in 2 hours   Current Medications    ALBUTEROL (PROVENTIL/VENTOLIN HFA) 90 MCG/ACTUATION INHALER    Inhale 2 puffs into the lungs every 6 (six) hours as needed.    ASPIRIN/ACETAMINOPHEN/CAFFEINE (EXCEDRIN EXTRA STRENGTH ORAL)    Take 2 tablets by mouth as needed.    AZELASTINE-FLUTICASONE (DYMISTA) 137-50 MCG/SPRAY SPRY NASSAL SPRAY    1 spray by Nasal route as needed.    BUTALBITAL-ACETAMINOP-CAF-COD  -34-30 MG CAP    Take 1 capsule by mouth 2 (two) times daily as needed.    PROMETHAZINE-DEXTROMETHORPHAN (PROMETHAZINE-DM) 6.25-15 MG/5 ML SYRP    Take 5 mLs by mouth 4 (four) times daily as needed.   Discontinued Medications    PROCHLORPERAZINE (COMPAZINE) 10 MG TABLET    Take 1 tablet (10 mg total) by mouth every 6 (six) hours as needed.        Past Surgical History:   Procedure Laterality Date    APPENDECTOMY      STOMACH SURGERY      ulcer        Past Medical History:   Diagnosis Date    Headache     History of bleeding ulcers     Known health problems: none         Family History   Problem Relation Age of Onset    Cancer Mother         Social History     Socioeconomic History    Marital status:    Tobacco Use    Smoking status: Never    Smokeless tobacco: Never   Substance and Sexual Activity    Alcohol use: Not Currently    Drug use: Never          Review of Systems  Review of Systems   Constitutional: Negative for appetite change.   HENT: Negative for sinus pressure and sore throat.    Eyes: Negative for visual disturbance.   Respiratory: Negative for cough and shortness of breath.    Cardiovascular: Negative for chest pain.   Gastrointestinal: Negative for diarrhea and nausea.   Endocrine: Negative for cold intolerance and heat intolerance.   Genitourinary: Negative for dysuria.   Musculoskeletal: Negative for arthralgias and myalgias.   Skin: Negative for rash.   Allergic/Immunologic: Negative for immunocompromised state.   Neurological:        See HPI   Hematological: Does not bruise/bleed easily.   Psychiatric/Behavioral: Negative for hallucinations.      General: alert and oriented, no acute distress, no audible wheezes, pulse intact, no edema    Vitals:    11/07/23 1007   BP: 108/78   Pulse: 90        Cognition and Comprehension  Speech and language intact  Follows commands  Speech fluent  Attention intact  Memory for recent events intact from history taking  Affect pleasant  Fund of  knowledge adequate    Cranial nerves  II. Optic: Visual fields full to confrontation both eyes  III, IV, VI. Oculomotor: Intact, Pupils equal, round and reactive to light, no nystagmus  V. Trigeminal: sensation to light touch normal  VII. No facial asymmetry or facial weakness  VIII. Hearing intact to spoken voice  IX/X. Glossopharyngeal/Vagus: Voice normal, palate rises symmetrically  XI. Axillary: Shoulder shrug normal  XII. Hypoglossal: Intact    Muscle Strength and Tone  Normal upper extremity tone  Normal lower extremity tone  Normal upper extremity strength  Normal lower extremity strength    Sensation  Intact to light touch and temperature    Reflexes  Normal and symmetric    Coordination and Gait  Finger to nose normal  Gait normal       Herber was seen today for migraine.    Diagnoses and all orders for this visit:    Chronic migraine without aura without status migrainosus, not intractable  -     Ambulatory referral/consult to Neurology  -     propranoloL (INDERAL) 20 MG tablet; Take 1 tablet (20 mg total) by mouth 2 (two) times daily.  -     sumatriptan (IMITREX) 50 MG tablet; Take 1 tablet prn once for migraine, may repeat dose in 2 hours       Patient needs adequate headache preventive medicine.  We will try propranolol, if no improvement in frequency and severity of headaches can try Emgality. Reduce excedrin use to 2 times a week

## 2023-11-13 ENCOUNTER — HOSPITAL ENCOUNTER (EMERGENCY)
Facility: HOSPITAL | Age: 38
Discharge: HOME | End: 2023-11-13
Attending: EMERGENCY MEDICINE
Payer: COMMERCIAL

## 2023-11-13 VITALS
HEART RATE: 70 BPM | DIASTOLIC BLOOD PRESSURE: 90 MMHG | RESPIRATION RATE: 16 BRPM | SYSTOLIC BLOOD PRESSURE: 121 MMHG | OXYGEN SATURATION: 100 % | TEMPERATURE: 98 F | BODY MASS INDEX: 31.93 KG/M2 | WEIGHT: 210 LBS

## 2023-11-13 DIAGNOSIS — R10.32 LEFT LOWER QUADRANT ABDOMINAL PAIN: Primary | ICD-10-CM

## 2023-11-13 LAB
ALBUMIN SERPL-MCNC: 4.3 G/DL (ref 3.5–5)
ALBUMIN/GLOB SERPL: 1.3 RATIO (ref 1.1–2)
ALP SERPL-CCNC: 75 UNIT/L (ref 40–150)
ALT SERPL-CCNC: 55 UNIT/L (ref 0–55)
APPEARANCE UR: CLEAR
AST SERPL-CCNC: 34 UNIT/L (ref 5–34)
BACTERIA #/AREA URNS AUTO: ABNORMAL /HPF
BASOPHILS # BLD AUTO: 0.07 X10(3)/MCL
BASOPHILS NFR BLD AUTO: 0.6 %
BILIRUB SERPL-MCNC: 0.3 MG/DL
BILIRUB UR QL STRIP.AUTO: NEGATIVE
BUN SERPL-MCNC: 15 MG/DL (ref 8.9–20.6)
CALCIUM SERPL-MCNC: 10 MG/DL (ref 8.4–10.2)
CHLORIDE SERPL-SCNC: 110 MMOL/L (ref 98–107)
CO2 SERPL-SCNC: 24 MMOL/L (ref 22–29)
COLOR UR AUTO: ABNORMAL
CREAT SERPL-MCNC: 0.92 MG/DL (ref 0.73–1.18)
EOSINOPHIL # BLD AUTO: 0.11 X10(3)/MCL (ref 0–0.9)
EOSINOPHIL NFR BLD AUTO: 0.9 %
ERYTHROCYTE [DISTWIDTH] IN BLOOD BY AUTOMATED COUNT: 12.8 % (ref 11.5–17)
GFR SERPLBLD CREATININE-BSD FMLA CKD-EPI: >60 MLS/MIN/1.73/M2
GLOBULIN SER-MCNC: 3.2 GM/DL (ref 2.4–3.5)
GLUCOSE SERPL-MCNC: 98 MG/DL (ref 74–100)
GLUCOSE UR QL STRIP.AUTO: NORMAL
HCT VFR BLD AUTO: 42.2 % (ref 42–52)
HGB BLD-MCNC: 14 G/DL (ref 14–18)
IMM GRANULOCYTES # BLD AUTO: 0.05 X10(3)/MCL (ref 0–0.04)
IMM GRANULOCYTES NFR BLD AUTO: 0.4 %
KETONES UR QL STRIP.AUTO: NEGATIVE
LEUKOCYTE ESTERASE UR QL STRIP.AUTO: NEGATIVE
LIPASE SERPL-CCNC: 20 U/L
LYMPHOCYTES # BLD AUTO: 3.39 X10(3)/MCL (ref 0.6–4.6)
LYMPHOCYTES NFR BLD AUTO: 27.2 %
MCH RBC QN AUTO: 29.2 PG (ref 27–31)
MCHC RBC AUTO-ENTMCNC: 33.2 G/DL (ref 33–36)
MCV RBC AUTO: 88.1 FL (ref 80–94)
MONOCYTES # BLD AUTO: 0.98 X10(3)/MCL (ref 0.1–1.3)
MONOCYTES NFR BLD AUTO: 7.9 %
MUCOUS THREADS URNS QL MICRO: ABNORMAL /LPF
NEUTROPHILS # BLD AUTO: 7.85 X10(3)/MCL (ref 2.1–9.2)
NEUTROPHILS NFR BLD AUTO: 63 %
NITRITE UR QL STRIP.AUTO: NEGATIVE
NRBC BLD AUTO-RTO: 0 %
PH UR STRIP.AUTO: 6.5 [PH]
PLATELET # BLD AUTO: 244 X10(3)/MCL (ref 130–400)
PMV BLD AUTO: 10.4 FL (ref 7.4–10.4)
POTASSIUM SERPL-SCNC: 4.2 MMOL/L (ref 3.5–5.1)
PROT SERPL-MCNC: 7.5 GM/DL (ref 6.4–8.3)
PROT UR QL STRIP.AUTO: NEGATIVE
RBC # BLD AUTO: 4.79 X10(6)/MCL (ref 4.7–6.1)
RBC #/AREA URNS AUTO: ABNORMAL /HPF
RBC UR QL AUTO: NEGATIVE
SODIUM SERPL-SCNC: 141 MMOL/L (ref 136–145)
SP GR UR STRIP.AUTO: 1.03 (ref 1–1.03)
SQUAMOUS #/AREA URNS LPF: ABNORMAL /HPF
UROBILINOGEN UR STRIP-ACNC: NORMAL
WBC # SPEC AUTO: 12.45 X10(3)/MCL (ref 4.5–11.5)
WBC #/AREA URNS AUTO: ABNORMAL /HPF

## 2023-11-13 PROCEDURE — 80053 COMPREHEN METABOLIC PANEL: CPT | Performed by: NURSE PRACTITIONER

## 2023-11-13 PROCEDURE — 99285 EMERGENCY DEPT VISIT HI MDM: CPT | Mod: 25

## 2023-11-13 PROCEDURE — 81001 URINALYSIS AUTO W/SCOPE: CPT | Performed by: NURSE PRACTITIONER

## 2023-11-13 PROCEDURE — 83690 ASSAY OF LIPASE: CPT | Performed by: NURSE PRACTITIONER

## 2023-11-13 PROCEDURE — 25500020 PHARM REV CODE 255: Performed by: NURSE PRACTITIONER

## 2023-11-13 PROCEDURE — 85025 COMPLETE CBC W/AUTO DIFF WBC: CPT | Performed by: NURSE PRACTITIONER

## 2023-11-13 RX ORDER — LACTULOSE 10 G/15ML
10 SOLUTION ORAL 3 TIMES DAILY
Qty: 135 ML | Refills: 0 | Status: SHIPPED | OUTPATIENT
Start: 2023-11-13 | End: 2023-11-16

## 2023-11-13 RX ADMIN — IOPAMIDOL 100 ML: 755 INJECTION, SOLUTION INTRAVENOUS at 05:11

## 2023-11-13 NOTE — FIRST PROVIDER EVALUATION
Medical screening examination initiated.  I have conducted a focused provider triage encounter, findings are as follows:    Brief history of present illness:  Patient states abdominal pan x 5 months. Denies any nausea or vomiting.     There were no vitals filed for this visit.    Pertinent physical exam:  Awake, alert, ambulatory    Brief workup plan:  Labs    Preliminary workup initiated; this workup will be continued and followed by the physician or advanced practice provider that is assigned to the patient when roomed.

## 2023-11-13 NOTE — ED PROVIDER NOTES
Encounter Date: 11/13/2023       History     Chief Complaint   Patient presents with    Abdominal Pain     Left sided abdominal pain that radiates to right side that started 5 months ago. Denies any nausea/vomiting/diarrhea/fevers. Has had appendix removed. Hx of bleeding ulcers.      See MDM    The history is provided by the patient. No  was used.     Review of patient's allergies indicates:  No Known Allergies  Past Medical History:   Diagnosis Date    Headache     History of bleeding ulcers      Past Surgical History:   Procedure Laterality Date    APPENDECTOMY      STOMACH SURGERY      ulcer     Family History   Problem Relation Age of Onset    Cancer Mother      Social History     Tobacco Use    Smoking status: Never    Smokeless tobacco: Never   Substance Use Topics    Alcohol use: Not Currently    Drug use: Never     Review of Systems   Constitutional:  Negative for fever.   Respiratory:  Negative for cough and shortness of breath.    Cardiovascular:  Negative for chest pain.   Gastrointestinal:  Positive for abdominal pain.   Genitourinary:  Negative for difficulty urinating and dysuria.   Musculoskeletal:  Negative for gait problem.   Skin:  Negative for color change.   Neurological:  Negative for dizziness, speech difficulty and headaches.   Psychiatric/Behavioral:  Negative for hallucinations and suicidal ideas.    All other systems reviewed and are negative.      Physical Exam     Initial Vitals [11/13/23 1342]   BP Pulse Resp Temp SpO2   129/74 72 20 98 °F (36.7 °C) 97 %      MAP       --         Physical Exam    Nursing note and vitals reviewed.  Constitutional: He appears well-developed and well-nourished.   HENT:   Head: Normocephalic.   Eyes: EOM are normal.   Neck: Neck supple.   Normal range of motion.  Cardiovascular:  Normal rate, regular rhythm, normal heart sounds and intact distal pulses.           Pulmonary/Chest: Breath sounds normal.   Abdominal: Abdomen is soft. Bowel  sounds are normal.   Musculoskeletal:         General: Normal range of motion.      Cervical back: Normal range of motion and neck supple.     Neurological: He is alert and oriented to person, place, and time. He has normal strength.   Skin: Skin is warm and dry. Capillary refill takes less than 2 seconds.   Psychiatric: He has a normal mood and affect. His behavior is normal. Judgment and thought content normal.         ED Course   Procedures  Labs Reviewed   URINALYSIS, REFLEX TO URINE CULTURE - Abnormal; Notable for the following components:       Result Value    Mucous, UA Trace (*)     All other components within normal limits   COMPREHENSIVE METABOLIC PANEL - Abnormal; Notable for the following components:    Chloride 110 (*)     All other components within normal limits   CBC WITH DIFFERENTIAL - Abnormal; Notable for the following components:    WBC 12.45 (*)     IG# 0.05 (*)     All other components within normal limits   LIPASE - Normal   CBC W/ AUTO DIFFERENTIAL    Narrative:     The following orders were created for panel order CBC Auto Differential.  Procedure                               Abnormality         Status                     ---------                               -----------         ------                     CBC with Differential[[id number]]       Abnormal            Final result                 Please view results for these tests on the individual orders.          Imaging Results              CT Abdomen Pelvis With IV Contrast (Final result)  Result time 11/13/23 17:47:18      Final result by Enedina Martínez MD (11/13/23 17:47:18)                   Impression:      No abnormality seen      Electronically signed by: Enedina Martínez  Date:    11/13/2023  Time:    17:47               Narrative:    EXAMINATION:  CT ABDOMEN PELVIS WITH IV CONTRAST    CLINICAL HISTORY:  LLQ abdominal pain;    TECHNIQUE:  Low dose axial images, sagittal and coronal reformations were obtained from the  lung bases to the pubic symphysis following the IV administration of contrast. Automatic exposure control (AEC) is utilized to reduce patient radiation exposure.    COMPARISON:  09/24/2023    FINDINGS:  The lung bases are clear.    The liver appears normal.  There is a punctate cyst along the inferior margin of the liver which is stable since prior examination.  No liver mass or lesion is seen.  Portal and hepatic veins appear normal.    The gallbladder appears normal.  No obvious gallstones are seen.  No biliary dilatation is seen.  No pericholecystic fluid is seen.    The pancreas appears normal.  No pancreatic mass or lesion is seen.    The spleen shows no acute abnormality.    The adrenal glands appear normal.  No adrenal nodule is seen.    The kidneys appear normal.  No hydronephrosis is seen.  No hydroureter is seen.  No nephrolithiasis is seen.  No obvious ureteral stones are seen.    Urinary bladder appears grossly unremarkable.    No colitis is seen.  No diverticulitis is seen.  No obvious colonic mass or lesion is seen.    No free air is seen.  No free fluid is seen.                                       Medications   iopamidoL (ISOVUE-370) injection 100 mL (100 mLs Intravenous Given 11/13/23 1742)     Medical Decision Making  Historian:  Patient.  Patient is a 38-year-old male  that presents with left lower quadrant abdominal pain that has been present 5 months. Associated symptoms nothing. Surrounding information is nothing. Exacerbated by nothing. Relieved by nothing. Patient treatment prior to arrival none. Risk factors include none. Other history pertaining to this complaint nothing.   Assessment:  See physical exam.  DD:  Colitis, diverticulitis, enteritis      Amount and/or Complexity of Data Reviewed  Labs:      Details: The labs unremarkable  Radiology: ordered.     Details: CT abdomen and pelvis showed no acute findings  Discussion of management or test interpretation with external provider(s):  History was obtained.  Physical performed.  Patient does have a lot of stool and gas noted on the  view.  I will put him on some lactulose.  No medical or surgical consult indicated in ER.  No social determinants that affect healthcare were noted.    Risk  Prescription drug management.                               Clinical Impression:   Final diagnoses:  [R10.32] Left lower quadrant abdominal pain (Primary)        ED Disposition Condition    Discharge Stable          ED Prescriptions       Medication Sig Dispense Start Date End Date Auth. Provider    lactulose (CHRONULAC) 20 gram/30 mL Soln Take 15 mLs (10 g total) by mouth 3 (three) times daily. for 3 days 135 mL 11/13/2023 11/16/2023 Magnus Valladares FNP          Follow-up Information       Follow up With Specialties Details Why Contact Info    Your Primary Care Provider  Call in 3 days ed follow up              Magnus Valladares FNP  11/13/23 7876

## 2024-03-15 ENCOUNTER — OFFICE VISIT (OUTPATIENT)
Dept: NEUROLOGY | Facility: CLINIC | Age: 39
End: 2024-03-15
Payer: COMMERCIAL

## 2024-03-15 VITALS
WEIGHT: 221 LBS | HEART RATE: 84 BPM | DIASTOLIC BLOOD PRESSURE: 88 MMHG | HEIGHT: 68 IN | BODY MASS INDEX: 33.49 KG/M2 | SYSTOLIC BLOOD PRESSURE: 124 MMHG

## 2024-03-15 DIAGNOSIS — G43.909 MIGRAINE WITHOUT STATUS MIGRAINOSUS, NOT INTRACTABLE, UNSPECIFIED MIGRAINE TYPE: Primary | ICD-10-CM

## 2024-03-15 PROCEDURE — 99214 OFFICE O/P EST MOD 30 MIN: CPT | Mod: S$GLB,,, | Performed by: NURSE PRACTITIONER

## 2024-03-15 PROCEDURE — 1160F RVW MEDS BY RX/DR IN RCRD: CPT | Mod: CPTII,S$GLB,, | Performed by: NURSE PRACTITIONER

## 2024-03-15 PROCEDURE — 3008F BODY MASS INDEX DOCD: CPT | Mod: CPTII,S$GLB,, | Performed by: NURSE PRACTITIONER

## 2024-03-15 PROCEDURE — 1159F MED LIST DOCD IN RCRD: CPT | Mod: CPTII,S$GLB,, | Performed by: NURSE PRACTITIONER

## 2024-03-15 PROCEDURE — 3074F SYST BP LT 130 MM HG: CPT | Mod: CPTII,S$GLB,, | Performed by: NURSE PRACTITIONER

## 2024-03-15 PROCEDURE — 3079F DIAST BP 80-89 MM HG: CPT | Mod: CPTII,S$GLB,, | Performed by: NURSE PRACTITIONER

## 2024-03-15 PROCEDURE — 99999 PR PBB SHADOW E&M-EST. PATIENT-LVL III: CPT | Mod: PBBFAC,,, | Performed by: NURSE PRACTITIONER

## 2024-03-15 RX ORDER — GALCANEZUMAB 120 MG/ML
120 INJECTION, SOLUTION SUBCUTANEOUS
Qty: 1 ML | Refills: 5 | Status: SHIPPED | OUTPATIENT
Start: 2024-03-15 | End: 2024-03-20 | Stop reason: ALTCHOICE

## 2024-03-15 RX ORDER — RIMEGEPANT SULFATE 75 MG/75MG
75 TABLET, ORALLY DISINTEGRATING ORAL
Qty: 16 TABLET | Refills: 5 | Status: SHIPPED | OUTPATIENT
Start: 2024-03-15

## 2024-03-15 RX ORDER — GALCANEZUMAB 120 MG/ML
240 INJECTION, SOLUTION SUBCUTANEOUS ONCE
Qty: 2 ML | Refills: 0 | Status: SHIPPED | OUTPATIENT
Start: 2024-03-15 | End: 2024-03-15

## 2024-03-15 NOTE — PROGRESS NOTES
Subjective:       Patient ID: Herber Gar is a 38 y.o. male.    Chief Complaint: Migraine (Migraines are about the same. Propranolol is not helping. Sumatriptan makes her constipated. Migraines are just about everyday, lasting for a couple hours. Migraines are located to frontal area. Migraines are sensitive to light.)      History of Present Illness:  Follow-up visit for migraines.  He was started on propranolol at last visit, but has not noticed any change in his headaches.  He is still having daily head pain and is taking Excedrin daily.  He has tried taking the sumatriptan for rescue, but it causes GI upset and does not relieve the headache.      CT head 03/2023 normal    Review of Systems  I have reviewed a 12 point review of systems which is negative unless otherwise stated in HPI        Past Medical History:   Diagnosis Date    Headache     History of bleeding ulcers        Past Surgical History:   Procedure Laterality Date    APPENDECTOMY      STOMACH SURGERY      ulcer        Family History   Problem Relation Age of Onset    Cancer Mother         Social History     Socioeconomic History    Marital status:    Tobacco Use    Smoking status: Never    Smokeless tobacco: Never   Substance and Sexual Activity    Alcohol use: Not Currently    Drug use: Never        Outpatient Encounter Medications as of 3/15/2024   Medication Sig Dispense Refill    albuterol (PROVENTIL/VENTOLIN HFA) 90 mcg/actuation inhaler Inhale 2 puffs into the lungs every 6 (six) hours as needed.      aspirin/acetaminophen/caffeine (EXCEDRIN EXTRA STRENGTH ORAL) Take 2 tablets by mouth as needed.      azelastine-fluticasone (DYMISTA) 137-50 mcg/spray Spry nassal spray 1 spray by Nasal route as needed.      butalbitaL-acetaminop-caf-cod -70-30 mg Cap Take 1 capsule by mouth 2 (two) times daily as needed.      propranoloL (INDERAL) 20 MG tablet Take 1 tablet (20 mg total) by mouth 2 (two) times daily. 60 tablet 5     "[DISCONTINUED] sumatriptan (IMITREX) 50 MG tablet Take 1 tablet prn once for migraine, may repeat dose in 2 hours 9 tablet 5    galcanezumab-gnlm (EMGALITY PEN) 120 mg/mL PnIj Inject 2 mLs (240 mg total) into the skin once. for 1 dose 2 mL 0    galcanezumab-gnlm (EMGALITY PEN) 120 mg/mL PnIj Inject 1 mL (120 mg total) into the skin every 28 days. 1 mL 5    promethazine-dextromethorphan (PROMETHAZINE-DM) 6.25-15 mg/5 mL Syrp Take 5 mLs by mouth 4 (four) times daily as needed.      rimegepant (NURTEC) 75 mg odt Take 1 tablet (75 mg total) by mouth as needed for Migraine. Place ODT tablet on the tongue; alternatively the ODT tablet may be placed under the tongue 16 tablet 5     No facility-administered encounter medications on file as of 3/15/2024.      Objective:   /88 (BP Location: Left arm)   Pulse 84   Ht 5' 8" (1.727 m)   Wt 100.2 kg (221 lb)   BMI 33.60 kg/m²        Physical Exam  NAD  alert and oriented  cognition and perception intact  no aphasia  EOMI  no facial asymmetry  no dysarthria  moves all extremities symmetrically  no gross coordination abnormalities  gait normal       Assessment & Plan:      1. Migraine without status migrainosus, not intractable, unspecified migraine type  Overview:  Started having headaches in grade school.  Headaches are throbbing and holocephalic.  Worse with movement.  He has associated photophobia and nausea.  He has tried propranolol and Imitrex.  Propranolol not effective and Imitrex causes significant GI upset.    Assessment & Plan:  -start emgality  -nurtec for rescue  -stop daily excedrin    Orders:  -     galcanezumab-gnlm (EMGALITY PEN) 120 mg/mL PnIj; Inject 2 mLs (240 mg total) into the skin once. for 1 dose  Dispense: 2 mL; Refill: 0  -     galcanezumab-gnlm (EMGALITY PEN) 120 mg/mL PnIj; Inject 1 mL (120 mg total) into the skin every 28 days.  Dispense: 1 mL; Refill: 5  -     rimegepant (NURTEC) 75 mg odt; Take 1 tablet (75 mg total) by mouth as needed for " Migraine. Place ODT tablet on the tongue; alternatively the ODT tablet may be placed under the tongue  Dispense: 16 tablet; Refill: 5          This note was created with the assistance of voice recognition software. There may be transcription errors as a result of using this technology however minimal. Effort has been made to assure accuracy of transcription but any obvious errors or omissions should be clarified with the author of the document.

## 2024-03-19 ENCOUNTER — TELEPHONE (OUTPATIENT)
Dept: NEUROLOGY | Facility: CLINIC | Age: 39
End: 2024-03-19
Payer: COMMERCIAL

## 2024-03-19 DIAGNOSIS — G43.709 CHRONIC MIGRAINE WITHOUT AURA WITHOUT STATUS MIGRAINOSUS, NOT INTRACTABLE: Primary | ICD-10-CM

## 2024-03-19 NOTE — TELEPHONE ENCOUNTER
Emgality denial spoke with insurance states must have tried aimovig/ajovy please advise on Rx change.

## 2024-03-20 ENCOUNTER — TELEPHONE (OUTPATIENT)
Dept: NEUROLOGY | Facility: CLINIC | Age: 39
End: 2024-03-20
Payer: COMMERCIAL

## 2024-03-20 NOTE — TELEPHONE ENCOUNTER
----- Message from Aysha Avalos sent at 3/20/2024  2:04 PM CDT -----  Regarding: returning call  Wed 20-Mar-24 12:03p TAKEN    To:          Ofc  From:        Herber Gar  Phone:       [phone]  Patient:     Same  :         1985  RegDr:      Dr Oneida Javed  Ref:         returning call to office    Subject:          Patient Calls  ClrID:    [phone]

## 2024-03-20 NOTE — TELEPHONE ENCOUNTER
Informed prescription for Ajovy is approved. He can print a coupon on line for Ajovy and bring it to his pharmacy

## 2024-04-22 ENCOUNTER — TELEPHONE (OUTPATIENT)
Dept: NEUROLOGY | Facility: CLINIC | Age: 39
End: 2024-04-22
Payer: COMMERCIAL

## 2024-04-22 NOTE — TELEPHONE ENCOUNTER
----- Message from Aysha Avalos sent at 2024  8:22 AM CDT -----  Regarding: med questions  24 11:41a TAKEN    To:          Office  From:        Herber Gar  Phone:       [phone]  Patient:     Same  :         [birth year]  RegDr:      Dr Oneida Javed  Ref:         Patient just left the office and needs to speak to the nurse regarding his medication. Please call    Subject:          Patient Calls  ClrID:    [phone]

## 2024-06-26 ENCOUNTER — TELEPHONE (OUTPATIENT)
Dept: NEUROLOGY | Facility: CLINIC | Age: 39
End: 2024-06-26
Payer: COMMERCIAL

## 2024-06-26 NOTE — TELEPHONE ENCOUNTER
Patient states that he is having trouble with his Ajovy, he missed the first dose due to the injectable not working and the pharmacy stating the injectable didn't have medication in it, so he is asking for us to reach out to the pharmacy and see if he still has refills due to the problem he's been having with the medicaiton.

## 2024-07-24 ENCOUNTER — OFFICE VISIT (OUTPATIENT)
Dept: NEUROLOGY | Facility: CLINIC | Age: 39
End: 2024-07-24
Payer: COMMERCIAL

## 2024-07-24 VITALS
HEART RATE: 74 BPM | SYSTOLIC BLOOD PRESSURE: 138 MMHG | WEIGHT: 221 LBS | HEIGHT: 68 IN | DIASTOLIC BLOOD PRESSURE: 98 MMHG | BODY MASS INDEX: 33.49 KG/M2

## 2024-07-24 DIAGNOSIS — G43.709 CHRONIC MIGRAINE WITHOUT AURA WITHOUT STATUS MIGRAINOSUS, NOT INTRACTABLE: ICD-10-CM

## 2024-07-24 DIAGNOSIS — G43.909 MIGRAINE WITHOUT STATUS MIGRAINOSUS, NOT INTRACTABLE, UNSPECIFIED MIGRAINE TYPE: ICD-10-CM

## 2024-07-24 DIAGNOSIS — G43.009 MIGRAINE WITHOUT AURA AND WITHOUT STATUS MIGRAINOSUS, NOT INTRACTABLE: Primary | ICD-10-CM

## 2024-07-24 PROCEDURE — 99999 PR PBB SHADOW E&M-EST. PATIENT-LVL III: CPT | Mod: PBBFAC,,, | Performed by: NURSE PRACTITIONER

## 2024-07-24 PROCEDURE — 99213 OFFICE O/P EST LOW 20 MIN: CPT | Mod: S$GLB,,, | Performed by: NURSE PRACTITIONER

## 2024-07-24 PROCEDURE — 3080F DIAST BP >= 90 MM HG: CPT | Mod: CPTII,S$GLB,, | Performed by: NURSE PRACTITIONER

## 2024-07-24 PROCEDURE — 3075F SYST BP GE 130 - 139MM HG: CPT | Mod: CPTII,S$GLB,, | Performed by: NURSE PRACTITIONER

## 2024-07-24 PROCEDURE — 1160F RVW MEDS BY RX/DR IN RCRD: CPT | Mod: CPTII,S$GLB,, | Performed by: NURSE PRACTITIONER

## 2024-07-24 PROCEDURE — 3008F BODY MASS INDEX DOCD: CPT | Mod: CPTII,S$GLB,, | Performed by: NURSE PRACTITIONER

## 2024-07-24 PROCEDURE — 1159F MED LIST DOCD IN RCRD: CPT | Mod: CPTII,S$GLB,, | Performed by: NURSE PRACTITIONER

## 2024-07-24 RX ORDER — RIMEGEPANT SULFATE 75 MG/75MG
75 TABLET, ORALLY DISINTEGRATING ORAL
Qty: 16 TABLET | Refills: 5 | Status: SHIPPED | OUTPATIENT
Start: 2024-07-24

## 2024-07-24 NOTE — PROGRESS NOTES
Subjective:       Patient ID: Herber Gar is a 38 y.o. male.    Chief Complaint: Migraine (Pt states HA's are daily difficulty receiving medication ajovy )      History of Present Illness:  Follow-up visit for migraine.  Headaches are unchanged since last visit.  He has not been able to start Ajovy or Nurtec yet due to issues with his pharmacy.            Past Medical History:   Diagnosis Date    Headache     History of bleeding ulcers        Past Surgical History:   Procedure Laterality Date    APPENDECTOMY      STOMACH SURGERY      ulcer        Family History   Problem Relation Name Age of Onset    Cancer Mother          Social History     Socioeconomic History    Marital status:    Tobacco Use    Smoking status: Never    Smokeless tobacco: Never   Substance and Sexual Activity    Alcohol use: Not Currently    Drug use: Never     Social Determinants of Health     Financial Resource Strain: Low Risk  (12/8/2022)    [facility], [facility] Its Subsidiaries and Affiliates    Overall Financial Resource Strain (CARDIA)     Difficulty of Paying Living Expenses: Not hard at all   Food Insecurity: No Food Insecurity (12/8/2022)    Received from [employer] [facility] and Its Subsidiaries and Affiliates, [facility] [employer] and Its Subsidiaries and Affiliates    Hunger Vital Sign     Worried About Running Out of Food in the Last Year: Never true     Ran Out of Food in the Last Year: Never true   Transportation Needs: No Transportation Needs (12/8/2022)    Received from [employer] [facility] and Its Subsidiaries and Affiliates, [facility] and [facility]    PRAPARE - Transportation     Lack of Transportation (Medical): No     Lack of  Transportation (Non-Medical): No   Physical Activity: Sufficiently Active (12/8/2022)    [facility], [facility]    Exercise Vital Sign     Days of Exercise per Week: 7 days     Minutes of Exercise per Session: 60 min   Stress: Stress Concern Present (12/8/2022)    Received from [facility] and Its [facility] and Affiliates, [facility]    [facility] of Occupational [facility] - Occupational Stress Questionnaire     Feeling of Stress : To some extent   Housing Stability: Low Risk  (12/8/2022)    [facility], [facility]    Housing Stability Vital Sign     Unable to Pay for Housing in the Last Year: No     Number of Places Lived in the Last Year: 1     In the last 12 months, was there a time when you did not have a steady place to sleep or slept in a shelter (including now)?: No        Outpatient Encounter Medications as of 7/24/2024   Medication Sig Dispense Refill    albuterol (PROVENTIL/VENTOLIN HFA) 90 mcg/actuation inhaler Inhale 2 puffs into the lungs every 6 (six) hours as needed.      aspirin/acetaminophen/caffeine (EXCEDRIN EXTRA STRENGTH ORAL) Take 2 tablets by mouth as needed.      azelastine-fluticasone (DYMISTA) 137-50 mcg/spray Spry nassal spray 1 spray by Nasal route as needed.      [DISCONTINUED] fremanezumab-vfrm (AJOVY) 225 mg/1.5 mL autoinjector Inject 1.5 mLs (225 mg total) into the skin every 28 days. 1.5 mL 5    [DISCONTINUED] rimegepant (NURTEC) 75 mg odt Take 1 tablet (75 mg total) by mouth as needed for Migraine. Place ODT tablet on the tongue; alternatively  "the ODT tablet may be placed under the tongue 16 tablet 5    butalbitaL-acetaminop-caf-cod -38-30 mg Cap Take 1 capsule by mouth 2 (two) times daily as needed. (Patient not taking: Reported on 7/24/2024)      promethazine-dextromethorphan (PROMETHAZINE-DM) 6.25-15 mg/5 mL Syrp Take 5 mLs by mouth 4 (four) times daily as needed. (Patient not taking: Reported on 7/24/2024)      propranoloL (INDERAL) 20 MG tablet Take 1 tablet (20 mg total) by mouth 2 (two) times daily. (Patient not taking: Reported on 7/24/2024) 60 tablet 5     No facility-administered encounter medications on file as of 7/24/2024.      Objective:   BP (!) 138/98   Pulse 74   Ht 5' 8" (1.727 m)   Wt 100.2 kg (221 lb)   BMI 33.60 kg/m²        Physical Exam  NAD  alert and oriented  cognition and perception intact  no aphasia  EOMI  no facial asymmetry  no dysarthria  moves all extremities symmetrically  no gross coordination abnormalities  gait normal       Assessment & Plan:      1. Migraine without aura and without status migrainosus, not intractable  Overview:  Started having headaches in grade school.  Headaches are throbbing and holocephalic.  Worse with movement.  He has associated photophobia and nausea.  He has tried propranolol and Imitrex.  Propranolol not effective and Imitrex causes significant GI upset.    Assessment & Plan:  -[place] ajovy and nurtec [place] [facility]            This note was created with the assistance of voice recognition software. There may be transcription errors as a result of using this technology however minimal. Effort has been made to assure accuracy of transcription but any obvious errors or omissions should be clarified with the author of the document.      "

## 2024-12-10 ENCOUNTER — OFFICE VISIT (OUTPATIENT)
Dept: NEUROLOGY | Facility: CLINIC | Age: 39
End: 2024-12-10
Payer: COMMERCIAL

## 2024-12-10 VITALS
HEART RATE: 77 BPM | BODY MASS INDEX: 33.34 KG/M2 | WEIGHT: 220 LBS | HEIGHT: 68 IN | DIASTOLIC BLOOD PRESSURE: 88 MMHG | SYSTOLIC BLOOD PRESSURE: 135 MMHG

## 2024-12-10 DIAGNOSIS — G43.709 CHRONIC MIGRAINE WITHOUT AURA WITHOUT STATUS MIGRAINOSUS, NOT INTRACTABLE: ICD-10-CM

## 2024-12-10 DIAGNOSIS — G43.009 MIGRAINE WITHOUT AURA AND WITHOUT STATUS MIGRAINOSUS, NOT INTRACTABLE: Primary | ICD-10-CM

## 2024-12-10 PROCEDURE — 3075F SYST BP GE 130 - 139MM HG: CPT | Mod: CPTII,S$GLB,, | Performed by: NURSE PRACTITIONER

## 2024-12-10 PROCEDURE — 99999 PR PBB SHADOW E&M-EST. PATIENT-LVL III: CPT | Mod: PBBFAC,,, | Performed by: NURSE PRACTITIONER

## 2024-12-10 PROCEDURE — 99214 OFFICE O/P EST MOD 30 MIN: CPT | Mod: S$GLB,,, | Performed by: NURSE PRACTITIONER

## 2024-12-10 PROCEDURE — 3008F BODY MASS INDEX DOCD: CPT | Mod: CPTII,S$GLB,, | Performed by: NURSE PRACTITIONER

## 2024-12-10 PROCEDURE — 1159F MED LIST DOCD IN RCRD: CPT | Mod: CPTII,S$GLB,, | Performed by: NURSE PRACTITIONER

## 2024-12-10 PROCEDURE — 1160F RVW MEDS BY RX/DR IN RCRD: CPT | Mod: CPTII,S$GLB,, | Performed by: NURSE PRACTITIONER

## 2024-12-10 PROCEDURE — 3079F DIAST BP 80-89 MM HG: CPT | Mod: CPTII,S$GLB,, | Performed by: NURSE PRACTITIONER

## 2024-12-10 NOTE — PROGRESS NOTES
Subjective:       Patient ID: Herber Gar is a 39 y.o. male.    Chief Complaint: Migraine (Pt states HA's are daily has not been consistant with ajovy )      History of Present Illness:  Follow up visit for migraine.  He did have 1 injection of ajovy after last visit.  Migraines were less severe.  He has not taken it since, though.  Says he just forgot about it.  Still having daily migraines.  Taking excedrin daily.                  Past Medical History:   Diagnosis Date    Headache     History of bleeding ulcers        Past Surgical History:   Procedure Laterality Date    APPENDECTOMY      STOMACH SURGERY      ulcer        Family History   Problem Relation Name Age of Onset    Cancer Mother          Social History     Socioeconomic History    Marital status:    Tobacco Use    Smoking status: Never    Smokeless tobacco: Never   Substance and Sexual Activity    Alcohol use: Not Currently    Drug use: Never     Social Drivers of Health     Financial Resource Strain: Low Risk  (12/8/2022)    [facility], [facility] Subsidiaries and Affiliates    Overall Financial Resource Strain (CARDIA)     Difficulty of Paying Living Expenses: Not hard at all   Food Insecurity: No Food Insecurity (12/8/2022)    [facility], [facility] Subsidiaries and Affiliates    Hunger Vital Sign     Worried About Running Out of Food in the Last Year: Never true     Ran Out of Food in the Last Year: Never true   Transportation Needs: No Transportation Needs (12/8/2022)    Received from [facility] and Its Subsidiaries and Affiliates, [facility] Its Subsidiaries and Affiliates    [place]  - Transportation     Lack of Transportation (Medical): No     Lack of Transportation (Non-Medical): No   Physical Activity: Sufficiently Active (12/8/2022)    [facility], [facility]    Exercise Vital Sign     Days of Exercise per Week: 7 days     Minutes of Exercise per Session: 60 min   Stress: Stress Concern Present (12/8/2022)    [facility], [facility]    [facility] of Occupational Health - Occupational Stress Questionnaire     Feeling of Stress : To some extent   Housing Stability: Low Risk  (12/8/2022)    [facility], [facility] and Its Subsidiaries and Affiliates    Housing Stability Vital Sign     Unable to Pay for Housing in the Last Year: No     Number of Places Lived in the Last Year: 1     Unstable Housing in the Last Year: No        Outpatient Encounter Medications as of 12/10/2024   Medication Sig Dispense Refill    albuterol (PROVENTIL/VENTOLIN HFA) 90 mcg/actuation inhaler Inhale 2 puffs into the lungs every 6 (six) hours as needed.      aspirin/acetaminophen/caffeine (EXCEDRIN EXTRA STRENGTH ORAL) Take 2 tablets by mouth as needed.      azelastine-fluticasone (DYMISTA) 137-50 mcg/spray Spry nassal spray 1 spray by Nasal route as needed.      butalbitaL-acetaminop-caf-cod -80-30 mg Cap Take 1 capsule by mouth 2 (two) times daily as needed.      promethazine-dextromethorphan (PROMETHAZINE-DM) 6.25-15 mg/5 mL Syrp Take 5 mLs by mouth 4 (four) times daily as needed.      propranoloL (INDERAL) 20 MG tablet Take 1 tablet (20 mg total) by mouth 2  "(two) times daily. 60 tablet 5    rimegepant (NURTEC) 75 mg odt Take 1 tablet (75 mg total) by mouth as needed for Migraine. Place ODT tablet on the tongue; alternatively the ODT tablet may be placed under the tongue 16 tablet 5    [DISCONTINUED] fremanezumab-vfrm (AJOVY) 225 mg/1.5 mL autoinjector Inject 1.5 mLs (225 mg total) into the skin every 28 days. 1.5 mL 5    fremanezumab-vfrm (AJOVY) 225 mg/1.5 mL autoinjector Inject 1.5 mLs (225 mg total) into the skin every 28 days. 1.5 mL 5     No facility-administered encounter medications on file as of 12/10/2024.      Objective:   /88   Pulse 77   Ht 5' 8" (1.727 m)   Wt 99.8 kg (220 lb)   BMI 33.45 kg/m²        Physical Exam  NAD  alert and oriented  cognition and perception intact  no aphasia  EOMI  no facial asymmetry  no dysarthria  moves all extremities symmetrically  no gross coordination abnormalities  gait normal       Assessment & Plan:      1. Migraine without aura and without status migrainosus, not intractable  Overview:  Started having headaches in grade school.  Headaches are throbbing and holocephalic.  Worse with movement.  He has associated photophobia and nausea.  He has tried propranolol and Imitrex.  Propranolol not effective and Imitrex causes significant GI upset.    Assessment & Plan:  -resend ajovy.  Reduce excedrin      2. Chronic migraine without aura without status migrainosus, not intractable  Overview:  Started having headaches in grade school.  Headaches are throbbing and holocephalic.  Worse with movement.  He has associated photophobia and nausea.  He has tried propranolol and Imitrex.  Propranolol not effective and Imitrex causes significant GI upset.    Assessment & Plan:  -resend ajovy.  Reduce excedrin    Orders:  -     fremanezumab-vfrm (AJOVY) 225 mg/1.5 mL autoinjector; Inject 1.5 mLs (225 mg total) into the skin every 28 days.  Dispense: 1.5 mL; Refill: 5          This note was created with the assistance of voice " recognition software. There may be transcription errors as a result of using this technology however minimal. Effort has been made to assure accuracy of transcription but any obvious errors or omissions should be clarified with the author of the document.

## 2025-04-16 ENCOUNTER — OFFICE VISIT (OUTPATIENT)
Dept: NEUROLOGY | Facility: CLINIC | Age: 40
End: 2025-04-16
Payer: COMMERCIAL

## 2025-04-16 VITALS
WEIGHT: 221 LBS | SYSTOLIC BLOOD PRESSURE: 124 MMHG | DIASTOLIC BLOOD PRESSURE: 86 MMHG | BODY MASS INDEX: 33.49 KG/M2 | HEART RATE: 82 BPM | HEIGHT: 68 IN

## 2025-04-16 DIAGNOSIS — G43.809 OTHER MIGRAINE WITHOUT STATUS MIGRAINOSUS, NOT INTRACTABLE: Primary | ICD-10-CM

## 2025-04-16 PROCEDURE — 1159F MED LIST DOCD IN RCRD: CPT | Mod: CPTII,S$GLB,, | Performed by: NURSE PRACTITIONER

## 2025-04-16 PROCEDURE — 99214 OFFICE O/P EST MOD 30 MIN: CPT | Mod: S$GLB,,, | Performed by: NURSE PRACTITIONER

## 2025-04-16 PROCEDURE — 1160F RVW MEDS BY RX/DR IN RCRD: CPT | Mod: CPTII,S$GLB,, | Performed by: NURSE PRACTITIONER

## 2025-04-16 PROCEDURE — 3008F BODY MASS INDEX DOCD: CPT | Mod: CPTII,S$GLB,, | Performed by: NURSE PRACTITIONER

## 2025-04-16 PROCEDURE — 99999 PR PBB SHADOW E&M-EST. PATIENT-LVL III: CPT | Mod: PBBFAC,,, | Performed by: NURSE PRACTITIONER

## 2025-04-16 PROCEDURE — 3074F SYST BP LT 130 MM HG: CPT | Mod: CPTII,S$GLB,, | Performed by: NURSE PRACTITIONER

## 2025-04-16 PROCEDURE — 3079F DIAST BP 80-89 MM HG: CPT | Mod: CPTII,S$GLB,, | Performed by: NURSE PRACTITIONER

## 2025-04-16 RX ORDER — ATOGEPANT 60 MG/1
60 TABLET ORAL DAILY
Qty: 30 TABLET | Refills: 11 | Status: SHIPPED | OUTPATIENT
Start: 2025-04-16

## 2025-04-16 RX ORDER — UBROGEPANT 100 MG/1
100 TABLET ORAL
Qty: 16 TABLET | Refills: 2 | Status: SHIPPED | OUTPATIENT
Start: 2025-04-16

## 2025-04-16 NOTE — PROGRESS NOTES
Subjective:      Subjective  Patient ID: Herber Gar is a 39 y.o. male.     Chief Complaint:   Chief Complaint   Patient presents with    Migraine     Stop taking Ajovy in December. States it was not helping with migraines. Has to take Excedrin on a daily basis. Migraines are once weekly to various areas of head.             History of Present Illness:  Follow up visit for migraine.  Patient was on Ajovy for migraines but he stopped taking it in December. He felt as if it was not effective.  Still having daily headaches.  Taking excedrin daily.  Headaches are holocranial.  Migraines occur about once a week.  Nurtec was ineffective as well as imitrex and fioricet.                                Past Medical History:   Diagnosis Date    Headache      History of bleeding ulcers                 Past Surgical History:   Procedure Laterality Date    APPENDECTOMY        STOMACH SURGERY         ulcer                Family History   Problem Relation Name Age of Onset    Cancer Mother             Social History           Socioeconomic History    Marital status:    Tobacco Use    Smoking status: Never    Smokeless tobacco: Never   Substance and Sexual Activity    Alcohol use: Not Currently    Drug use: Never      Social Drivers of Health           Financial Resource Strain: Low Risk  (12/8/2022)     Received from [facility] and Its Subsidiaries and Affiliates, [employer] [facility] and Its Subsidiaries and Affiliates     Overall Financial Resource Strain (CARDIA)      Difficulty of Paying Living Expenses: Not hard at all   Food Insecurity: No Food Insecurity (12/8/2022)     Received from [facility] and Its Subsidiaries and Affiliates, [employer] and Its Subsidiaries and Affiliates     Hunger Vital Sign      Worried About Running Out of Food in the Last Year: Never  true      Ran Out of Food in the Last Year: Never true   Transportation Needs: No Transportation Needs (12/8/2022)     [facility], [facility] and [facility]     [facility]AP[facility] - Transportation      Lack of Transportation (Medical): No      Lack of Transportation (Non-Medical): No   Physical Activity: Sufficiently Active (12/8/2022)     [facility], [facility]     Exercise Vital Sign      Days of Exercise per Week: 7 days      Minutes of Exercise per Session: 60 min   Stress: Stress Concern Present (12/8/2022)     [facility], [facility] and Its Subsidiaries and Affiliates     [employer] [place] of Occupational Health - Occupational Stress Questionnaire      Feeling of Stress : To some extent   Housing Stability: Low Risk  (12/8/2022)     [facility], [facility]     Housing Stability Vital Sign      Unable to Pay for Housing in the Last Year: No      Number of Places Lived in the Last Year: 1      Unstable Housing in the Last Year: No         Encounter Medications          Outpatient Encounter Medications as of 12/10/2024   Medication Sig Dispense Refill    albuterol (PROVENTIL/VENTOLIN HFA) 90 mcg/actuation inhaler Inhale 2 puffs into the lungs every 6 (six) hours as needed.        aspirin/acetaminophen/caffeine (EXCEDRIN EXTRA STRENGTH ORAL) Take 2 tablets by mouth as needed.        azelastine-fluticasone (DYMISTA) 137-50 mcg/spray  "Spry nassal spray 1 spray by Nasal route as needed.        butalbitaL-acetaminop-caf-cod -70-30 mg Cap Take 1 capsule by mouth 2 (two) times daily as needed.        promethazine-dextromethorphan (PROMETHAZINE-DM) 6.25-15 mg/5 mL Syrp Take 5 mLs by mouth 4 (four) times daily as needed.        propranoloL (INDERAL) 20 MG tablet Take 1 tablet (20 mg total) by mouth 2 (two) times daily. 60 tablet 5    rimegepant (NURTEC) 75 mg odt Take 1 tablet (75 mg total) by mouth as needed for Migraine. Place ODT tablet on the tongue; alternatively the ODT tablet may be placed under the tongue 16 tablet 5    [DISCONTINUED] fremanezumab-vfrm (AJOVY) 225 mg/1.5 mL autoinjector Inject 1.5 mLs (225 mg total) into the skin every 28 days. 1.5 mL 5    fremanezumab-vfrm (AJOVY) 225 mg/1.5 mL autoinjector Inject 1.5 mLs (225 mg total) into the skin every 28 days. 1.5 mL 5      No facility-administered encounter medications on file as of 12/10/2024.         Objective:     Vitals:    04/16/25 1314   BP: 124/86   BP Location: Left arm   Patient Position: Sitting   Pulse: 82   Weight: 100.2 kg (221 lb)   Height: 5' 8" (1.727 m)         Objective  Physical Exam  NAD  alert and oriented  cognition and perception intact  no aphasia  EOMI  no facial asymmetry  no dysarthria  moves all extremities symmetrically  no gross coordination abnormalities  gait normal         Assessment & Plan:      1. Migraine without aura and without status migrainosus, not intractable  Overview:  Started having headaches in grade school.  Headaches are throbbing and holocephalic.  Worse with movement.  He has associated photophobia and nausea.  He has tried propranolol and Imitrex.  Propranolol not effective and Imitrex causes significant GI upset.     Assessment & Plan:  -will send in Quilipta for prevention and Ubrelvy 100 mg prn migraine. Patient to follow up with [facility] in 3 months. Advised to take a full three months worth of medication before deciding if it " works or not. Reduce excedrin        2. Chronic migraine without aura without status migrainosus, not intractable  Overview:  Started having headaches in grade school.  Headaches are throbbing and holocephalic.  Worse with movement.  He has associated photophobia and nausea.  He has tried propranolol and Imitrex.  Propranolol not effective and Imitrex causes significant GI upset.     Assessment & Plan:  -will send in Quilipta. Patient to follow up with [facility] in 3 months. Advised to take a full three months worth of medication before deciding if it works or not. Reduce excedrin

## 2025-06-27 ENCOUNTER — LAB VISIT (OUTPATIENT)
Dept: LAB | Facility: HOSPITAL | Age: 40
End: 2025-06-27
Attending: INTERNAL MEDICINE
Payer: COMMERCIAL

## 2025-06-27 DIAGNOSIS — K92.1 BLOOD IN STOOL: Primary | ICD-10-CM

## 2025-06-27 LAB
BASOPHILS # BLD AUTO: 0.05 X10(3)/MCL
BASOPHILS NFR BLD AUTO: 0.5 %
CRP SERPL-MCNC: 3.3 MG/L
EOSINOPHIL # BLD AUTO: 0.23 X10(3)/MCL (ref 0–0.9)
EOSINOPHIL NFR BLD AUTO: 2.4 %
ERYTHROCYTE [DISTWIDTH] IN BLOOD BY AUTOMATED COUNT: 12.9 % (ref 11.5–17)
HCT VFR BLD AUTO: 32.9 % (ref 42–52)
HGB BLD-MCNC: 10.3 G/DL (ref 14–18)
IMM GRANULOCYTES # BLD AUTO: 0.04 X10(3)/MCL (ref 0–0.04)
IMM GRANULOCYTES NFR BLD AUTO: 0.4 %
LYMPHOCYTES # BLD AUTO: 2.14 X10(3)/MCL (ref 0.6–4.6)
LYMPHOCYTES NFR BLD AUTO: 22.8 %
MCH RBC QN AUTO: 27.8 PG (ref 27–31)
MCHC RBC AUTO-ENTMCNC: 31.3 G/DL (ref 33–36)
MCV RBC AUTO: 88.9 FL (ref 80–94)
MONOCYTES # BLD AUTO: 0.63 X10(3)/MCL (ref 0.1–1.3)
MONOCYTES NFR BLD AUTO: 6.7 %
NEUTROPHILS # BLD AUTO: 6.3 X10(3)/MCL (ref 2.1–9.2)
NEUTROPHILS NFR BLD AUTO: 67.2 %
NRBC BLD AUTO-RTO: 0 %
PLATELET # BLD AUTO: 342 X10(3)/MCL (ref 130–400)
PMV BLD AUTO: 10.3 FL (ref 7.4–10.4)
RBC # BLD AUTO: 3.7 X10(6)/MCL (ref 4.7–6.1)
WBC # BLD AUTO: 9.39 X10(3)/MCL (ref 4.5–11.5)

## 2025-06-27 PROCEDURE — 36415 COLL VENOUS BLD VENIPUNCTURE: CPT

## 2025-06-27 PROCEDURE — 85025 COMPLETE CBC W/AUTO DIFF WBC: CPT

## 2025-06-27 PROCEDURE — 86140 C-REACTIVE PROTEIN: CPT
